# Patient Record
Sex: MALE | Race: WHITE
[De-identification: names, ages, dates, MRNs, and addresses within clinical notes are randomized per-mention and may not be internally consistent; named-entity substitution may affect disease eponyms.]

---

## 2017-04-16 ENCOUNTER — HOSPITAL ENCOUNTER (INPATIENT)
Dept: HOSPITAL 31 - C.ER | Age: 67
LOS: 5 days | Discharge: HOME | DRG: 871 | End: 2017-04-21
Attending: INTERNAL MEDICINE | Admitting: INTERNAL MEDICINE
Payer: MEDICARE

## 2017-04-16 VITALS — BODY MASS INDEX: 31.6 KG/M2

## 2017-04-16 DIAGNOSIS — E78.5: ICD-10-CM

## 2017-04-16 DIAGNOSIS — F17.211: ICD-10-CM

## 2017-04-16 DIAGNOSIS — Z79.899: ICD-10-CM

## 2017-04-16 DIAGNOSIS — E78.00: ICD-10-CM

## 2017-04-16 DIAGNOSIS — J44.0: ICD-10-CM

## 2017-04-16 DIAGNOSIS — I50.33: ICD-10-CM

## 2017-04-16 DIAGNOSIS — E11.9: ICD-10-CM

## 2017-04-16 DIAGNOSIS — A41.9: Primary | ICD-10-CM

## 2017-04-16 DIAGNOSIS — K59.00: ICD-10-CM

## 2017-04-16 DIAGNOSIS — E66.9: ICD-10-CM

## 2017-04-16 DIAGNOSIS — I73.9: ICD-10-CM

## 2017-04-16 DIAGNOSIS — Z79.82: ICD-10-CM

## 2017-04-16 DIAGNOSIS — J45.909: ICD-10-CM

## 2017-04-16 DIAGNOSIS — J44.1: ICD-10-CM

## 2017-04-16 DIAGNOSIS — J18.9: ICD-10-CM

## 2017-04-16 DIAGNOSIS — Z79.4: ICD-10-CM

## 2017-04-16 DIAGNOSIS — R00.0: ICD-10-CM

## 2017-04-16 DIAGNOSIS — Z80.42: ICD-10-CM

## 2017-04-16 DIAGNOSIS — I11.0: ICD-10-CM

## 2017-04-16 DIAGNOSIS — I25.10: ICD-10-CM

## 2017-04-16 DIAGNOSIS — Z91.041: ICD-10-CM

## 2017-04-16 DIAGNOSIS — Z82.49: ICD-10-CM

## 2017-04-16 LAB
ALBUMIN/GLOB SERPL: 1.3 {RATIO} (ref 1–2.1)
ALP SERPL-CCNC: 57 U/L (ref 38–126)
ALT SERPL-CCNC: 42 U/L (ref 21–72)
APTT BLD: 27 SECONDS (ref 21–34)
AST SERPL-CCNC: 22 U/L (ref 17–59)
BASE EXCESS BLDV CALC-SCNC: -5.8 MMOL/L (ref 0–2)
BASOPHILS # BLD AUTO: 0.1 K/UL (ref 0–0.2)
BASOPHILS NFR BLD: 0.6 % (ref 0–2)
BILIRUB SERPL-MCNC: 0.5 MG/DL (ref 0.2–1.3)
BUN SERPL-MCNC: 38 MG/DL (ref 9–20)
CALCIUM SERPL-MCNC: 8.6 MG/DL (ref 8.6–10.4)
CHLORIDE SERPL-SCNC: 103 MMOL/L (ref 98–107)
CO2 SERPL-SCNC: 19 MMOL/L (ref 22–30)
EOSINOPHIL # BLD AUTO: 1 K/UL (ref 0–0.7)
EOSINOPHIL NFR BLD: 10.2 % (ref 0–4)
EOSINOPHIL NFR BLD: 9 % (ref 0–4)
ERYTHROCYTE [DISTWIDTH] IN BLOOD BY AUTOMATED COUNT: 16.6 % (ref 11.5–14.5)
GLOBULIN SER-MCNC: 2.8 GM/DL (ref 2.2–3.9)
GLUCOSE SERPL-MCNC: 199 MG/DL (ref 75–110)
HCT VFR BLD CALC: 38.7 % (ref 35–51)
INR PPP: 1
LG PLATELETS BLD QL SMEAR: PRESENT
LYMPHOCYTES # BLD AUTO: 0.5 K/UL (ref 1–4.3)
LYMPHOCYTES NFR BLD AUTO: 5.6 % (ref 20–40)
MCH RBC QN AUTO: 23.9 PG (ref 27–31)
MCHC RBC AUTO-ENTMCNC: 31.6 G/DL (ref 33–37)
MCV RBC AUTO: 75.7 FL (ref 80–94)
MONOCYTES # BLD: 0.8 K/UL (ref 0–0.8)
MONOCYTES NFR BLD: 8.6 % (ref 0–10)
NEUTROPHILS NFR BLD AUTO: 64 % (ref 50–75)
NRBC BLD AUTO-RTO: 0 % (ref 0–2)
PCO2 BLDV: 37 MMHG (ref 40–60)
PH BLDV: 7.33 [PH] (ref 7.32–7.43)
PLATELET # BLD: 198 K/UL (ref 130–400)
PMV BLD AUTO: 9.2 FL (ref 7.2–11.7)
POTASSIUM SERPL-SCNC: 3.8 MMOL/L (ref 3.6–5.2)
PROT SERPL-MCNC: 6.5 G/DL (ref 6.3–8.3)
SODIUM SERPL-SCNC: 136 MMOL/L (ref 132–148)
TOTAL CELLS COUNTED BLD: 100
WBC # BLD AUTO: 9.5 K/UL (ref 4.8–10.8)

## 2017-04-16 RX ADMIN — METHYLPREDNISOLONE SODIUM SUCCINATE SCH MG: 40 INJECTION, POWDER, FOR SOLUTION INTRAMUSCULAR; INTRAVENOUS at 14:50

## 2017-04-16 RX ADMIN — HUMAN INSULIN SCH: 100 INJECTION, SOLUTION SUBCUTANEOUS at 22:00

## 2017-04-16 RX ADMIN — HUMAN INSULIN SCH UNIT: 100 INJECTION, SOLUTION SUBCUTANEOUS at 19:20

## 2017-04-16 RX ADMIN — METHYLPREDNISOLONE SODIUM SUCCINATE SCH MG: 40 INJECTION, POWDER, FOR SOLUTION INTRAMUSCULAR; INTRAVENOUS at 21:53

## 2017-04-16 RX ADMIN — HUMAN INSULIN SCH UNIT: 100 INJECTION, SOLUTION SUBCUTANEOUS at 17:54

## 2017-04-16 RX ADMIN — PANTOPRAZOLE SODIUM SCH MG: 40 TABLET, DELAYED RELEASE ORAL at 13:18

## 2017-04-16 NOTE — C.PDOC
History Of Present Illness


 67 y/o male whose PMHx includes Asthma/COPD, and HTN, brought to the ED by 

ambulance for evaluation of shortness of breath, cough, chest pain, bilateral 

shoulder pain and bilateral leg pain which began yesterday. Patient denies fever

/chills, back pain, abdominal pain, nausea, vomiting, and diarrhea.


Time Seen by Provider: 17 08:38


Chief Complaint (Nursing): Chest Pain


History Per: Patient, EMS


History/Exam Limitations: no limitations


Onset/Duration Of Symptoms: Hrs


Current Symptoms Are (Timing): Still Present


Severity: Moderate


Quality: "Pain"


Associated Symptoms: denies: Nausea


Additional History Per: Patient





Past Medical History


Reviewed: Historical Data, Nursing Documentation, Vital Signs


Vital Signs: 


 Last Vital Signs











Temp  97.8 F   17 07:10


 


Pulse  98 H  17 07:10


 


Resp  20   17 07:10


 


BP  133/70   17 09:36


 


Pulse Ox  93 L  17 07:10














- Medical History


PMH: Arthritis (B/L), Asthma, COPD, Diabetes, Gastritis, HTN, 

Hypercholesterolemia


Surgical History: No Surg Hx





- [facility] Procedures








CONTRAST ARTERIOGRAM-LEG (09/24/15)


INSERT INT PENILE PROSTH (07/21/15)


PLAIN RADIOGRAPHY OF AORTA, BI LE ART USING L OSM CONTRAST (16)








Family History: States: CAD





- Social History


Hx Tobacco Use: No


Hx Alcohol Use: No


Hx Substance Use: No





- Immunization History


Hx Tetanus Toxoid Vaccination: No


Hx Influenza Vaccination: No


Hx Pneumococcal Vaccination: No





Review Of Systems


Except As Marked, All Systems Reviewed And Found Negative.


Constitutional: Negative for: Fever, Chills


Cardiovascular: Positive for: Chest Pain.  Negative for: Palpitations


Respiratory: Positive for: Cough, Shortness of Breath


Gastrointestinal: Negative for: Nausea, Vomiting, Abdominal Pain, Diarrhea


Musculoskeletal: Positive for: Shoulder Pain (bilateral ), Leg Pain (bilateral )





Physical Exam





- Physical Exam


Appears: Non-toxic, Other (mildly dyspneic)


Skin: Normal Color, Warm, Dry, No Rash


Head: Normacephalic


Eye(s): bilateral: Normal Inspection


Oral Mucosa: Moist


Neck: Supple


Cardiovascular: Rhythm Regular, Other (+tachycardic)


Respiratory: Rales (bilaterally at the bases ), Rhonchi (right lung ), No 

Wheezing, Other (patient speaking in full sentences )


Gastrointestinal/Abdominal: Normal Exam, Bowel Sounds, Soft, No Tenderness, No 

Guarding, No Rebound, Other (+obese )


Back: Normal Inspection, No Vertebral Tenderness, No Paraspinal Tenderness


Extremity: Normal ROM, No Calf Tenderness, Capillary Refill (< 2 seconds all 

digits ), No Swelling, No Other (no erythema to b/l lower extremities )


Pulses: Left Dorsalis Pedis: Normal, Right Dorsalis Pedis: Normal


Neurological/Psych: Oriented x3


Gait: Steady





ED Course And Treatment





- Laboratory Results


Result Diagrams: 


 17 07:17





 17 07:17


ECG: Interpreted By Me, Viewed By Me


ECG Rhythm: Sinus Tachycardia, R BBB


ECG Interpretation: Abnormal


Interpretation Of ECG: Sinus Tachycardia 143bpm. Normal axis. RBBB. No acute ST 

changes.


Rate From EC


O2 Sat by Pulse Oximetry: 94 (RA)


Pulse Ox Interpretation: Normal





- Other Rad


  ** CXR


X-Ray: Interpreted by Me, Viewed By Me, Read By Radiologist


Interpretation: Accession No. : [id number].  Patient Name / ID : ADAM [id number] [id number]  / [id number].  Exam Date : 2017 09:11:23 ( Approved ).  Study 

Comment :  Sex / Age : M  / 66Y.  Creator : Pat Case MD.  

Dictator : Pat Case MD.   :  Approver : Pat Case MD.  Approver2 :  Report Date : 2017 10:03:44.  My Comment :  

********************************************************************************

***.  HISTORY:  SOB.  COMPARISON:  Chest x-ray performed 16.  TECHNIQUE:  

Chest, one view.  FINDINGS:  LUNGS:  Consolidation involving the right lauren 

thorax predominantly within the inferior right upper, the middle, and the right 

lower lobe.  Please note that chest x-ray has limited sensitivity for the 

detection of pulmonary masses.  PLEURA:  Small right pleural effusion. No 

definite pneumothorax .  CARDIOVASCULAR:  Cardiomegaly.  OSSEOUS STRUCTURES:  

Degenerative changes.  VISUALIZED UPPER ABDOMEN:  Unremarkable.  OTHER FINDINGS

:  Examination limited by habitus.  IMPRESSION:  Consolidation involving the 

right lauren thorax predominantly within the inferior right upper, the middle, 

and the right lower lobe, consistent with pneumonia. Small right pleural 

effusion. Correlate clinically. Recommend follow-up to resolution.


Progress Note: Patient placed ion Bipap emergently.  Blood work, CXR and EKG 

ordered and greviewed.  VQ scan ordered to evaluate for PE (iodine allergy).  

Patient given PO ASA, IV solumedrol, duoneb treatment, no Lasix given due to 

borderline low BP.   CXR shows right sided pneumonia - IV Rocephin and IV 

Azithromcycin ordered.





- Physician Consult Information


Physician Contacted: Jude Pate


Outcome Of Conversation: Discussed patient with hospitalist, he agrees with 

admission for copd, pneumonia.





Medical Decision Making


Medical Decision Making: 


differential diagnoses considered: copd/asthma exacerbation, pneumonia, chf 

exacerbation, MI/ACS, PE, pleural effusion, lung CA





Disposition





- Disposition


Disposition: HOSPITALIZED


Disposition Time: 11:47


Condition: STABLE





- Clinical Impression


Clinical Impression: 


 Pneumonia, COPD exacerbation, Sinus tachycardia





- Scribe Statement


The provider has reviewed the documentation as recorded by the Scribe (Yaritza Correa)


Provider Attestation: 





All medical record entries made by the Scribe were at my direction and 

personally dictated by me. I have reviewed the chart and agree that the record 

accurately reflects my personal performance of the history, physical exam, 

medical decision making, and the department course for this patient. I have 

also personally directed, reviewed, and agree with the discharge instructions 

and disposition.





Decision To Admit





- Pt Status Changed To:


Hospital Disposition Of: Inpatient





- Admit Certification


Admit to Inpatient:: After my assessment, the patient will require 

hospitalization for at least two midnights.  This is because of the severity of 

symptoms shown, intensity of services needed, and/or the medical risk in this 

patient being treated as an outpatient.





- InPatient:


Physician Admission Certification: I certify that this patient requires 2 or 

more midnights of care for the following reason:: see notes





- .


Bed Request Type: Telemetry


Admitting Physician: Jude Pate


Patient Diagnosis: 


 Pneumonia, COPD exacerbation, Sinus tachycardia

## 2017-04-16 NOTE — NM
COMPARISON:

* 08/17/2016 ventilation-perfusion scan.



* 12/04/2015 CT thorax. Summary of findings on the comparison 

examination: Multifocal right-sided pulmonary opacity, possibly 

pneumonia.



* April 16, 2017. Single-view chest.



TECHNIQUE:

6.2 mCi technetium 99-m  Xe-133 Gas. 



4.1 mCI technetium 99-m MAA administered intravenously.



FINDINGS:



VENTILATION COMPONENT:

Persistent ventilation abnormalities right lung corresponding to 

multiple prior chest x-rays and CT scans.



PERFUSION COMPONENT:

Heterogeneous profusion, findings correlate to chest x-ray and are 

similar to those seen on the prior radionuclide scan.  Specifically 

matched perfusion abnormalities in the right lung.



IMPRESSION:

Low probability ventilation perfusion scan for pulmonary embolism.

## 2017-04-16 NOTE — RAD
HISTORY:

SOB  



COMPARISON:

Chest x-ray performed 8/17/16 



TECHNIQUE:

Chest, one view.



FINDINGS:





LUNGS:

Consolidation involving the right lauren thorax predominantly within 

the inferior right upper, the middle, and the right lower lobe.



Please note that chest x-ray has limited sensitivity for the 

detection of pulmonary masses.



PLEURA:

Small right pleural effusion. No definite pneumothorax .



CARDIOVASCULAR:

Cardiomegaly. 



OSSEOUS STRUCTURES:

 Degenerative changes.



VISUALIZED UPPER ABDOMEN:

Unremarkable.



OTHER FINDINGS:

Examination limited by habitus.



IMPRESSION:

Consolidation involving the right lauren thorax predominantly within 

the inferior right upper, the middle, and the right lower lobe, 

consistent with pneumonia. Small right pleural effusion. Correlate 

clinically. Recommend follow-up to resolution.
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
## 2017-04-17 LAB
ALBUMIN/GLOB SERPL: 1.2 {RATIO} (ref 1–2.1)
ALP SERPL-CCNC: 54 U/L (ref 38–126)
ALT SERPL-CCNC: 31 U/L (ref 21–72)
APTT BLD: 28 SECONDS (ref 21–34)
AST SERPL-CCNC: 17 U/L (ref 17–59)
BASOPHILS # BLD AUTO: 0 K/UL (ref 0–0.2)
BASOPHILS NFR BLD: 0.1 % (ref 0–2)
BILIRUB SERPL-MCNC: 0.5 MG/DL (ref 0.2–1.3)
BUN SERPL-MCNC: 24 MG/DL (ref 9–20)
CALCIUM SERPL-MCNC: 8.6 MG/DL (ref 8.6–10.4)
CHLORIDE SERPL-SCNC: 101 MMOL/L (ref 98–107)
CHOLEST SERPL-MCNC: 141 MG/DL (ref 0–199)
CO2 SERPL-SCNC: 22 MMOL/L (ref 22–30)
EOSINOPHIL # BLD AUTO: 0 K/UL (ref 0–0.7)
EOSINOPHIL NFR BLD: 0.1 % (ref 0–4)
ERYTHROCYTE [DISTWIDTH] IN BLOOD BY AUTOMATED COUNT: 16.3 % (ref 11.5–14.5)
GLOBULIN SER-MCNC: 2.9 GM/DL (ref 2.2–3.9)
GLUCOSE SERPL-MCNC: 305 MG/DL (ref 75–110)
HCT VFR BLD CALC: 35.7 % (ref 35–51)
INR PPP: 1.3
LG PLATELETS BLD QL SMEAR: PRESENT
LYMPHOCYTES # BLD AUTO: 0.9 K/UL (ref 1–4.3)
LYMPHOCYTES NFR BLD AUTO: 4.3 % (ref 20–40)
MAGNESIUM SERPL-MCNC: 2.2 MG/DL (ref 1.6–2.3)
MCH RBC QN AUTO: 23.8 PG (ref 27–31)
MCHC RBC AUTO-ENTMCNC: 31.3 G/DL (ref 33–37)
MCV RBC AUTO: 75.8 FL (ref 80–94)
MONOCYTES # BLD: 1.5 K/UL (ref 0–0.8)
MONOCYTES NFR BLD: 6.9 % (ref 0–10)
NEUTROPHILS NFR BLD AUTO: 57 % (ref 50–75)
NRBC BLD AUTO-RTO: 0 % (ref 0–2)
PLATELET # BLD: 224 K/UL (ref 130–400)
PMV BLD AUTO: 9.6 FL (ref 7.2–11.7)
POTASSIUM SERPL-SCNC: 4.1 MMOL/L (ref 3.6–5.2)
PROT SERPL-MCNC: 6.4 G/DL (ref 6.3–8.3)
SODIUM SERPL-SCNC: 135 MMOL/L (ref 132–148)
TOTAL CELLS COUNTED BLD: 100
WBC # BLD AUTO: 21.3 K/UL (ref 4.8–10.8)

## 2017-04-17 RX ADMIN — METHYLPREDNISOLONE SODIUM SUCCINATE SCH MG: 40 INJECTION, POWDER, FOR SOLUTION INTRAMUSCULAR; INTRAVENOUS at 14:12

## 2017-04-17 RX ADMIN — POLYVINYL ALCOHOL PRN DROP: 14 SOLUTION/ DROPS OPHTHALMIC at 05:20

## 2017-04-17 RX ADMIN — IPRATROPIUM BROMIDE AND ALBUTEROL SULFATE SCH ML: .5; 3 SOLUTION RESPIRATORY (INHALATION) at 08:36

## 2017-04-17 RX ADMIN — IPRATROPIUM BROMIDE AND ALBUTEROL SULFATE SCH ML: .5; 3 SOLUTION RESPIRATORY (INHALATION) at 11:31

## 2017-04-17 RX ADMIN — HUMAN INSULIN SCH UNIT: 100 INJECTION, SOLUTION SUBCUTANEOUS at 17:08

## 2017-04-17 RX ADMIN — IPRATROPIUM BROMIDE AND ALBUTEROL SULFATE SCH: .5; 3 SOLUTION RESPIRATORY (INHALATION) at 03:40

## 2017-04-17 RX ADMIN — IMIPENEM AND CILASTATIN SODIUM SCH MLS/HR: 250; 250 INJECTION, POWDER, FOR SOLUTION INTRAVENOUS at 17:16

## 2017-04-17 RX ADMIN — Medication SCH MG: at 10:04

## 2017-04-17 RX ADMIN — Medication SCH MG: at 17:59

## 2017-04-17 RX ADMIN — HUMAN INSULIN SCH UNIT: 100 INJECTION, SOLUTION SUBCUTANEOUS at 22:09

## 2017-04-17 RX ADMIN — IPRATROPIUM BROMIDE AND ALBUTEROL SULFATE SCH ML: .5; 3 SOLUTION RESPIRATORY (INHALATION) at 19:17

## 2017-04-17 RX ADMIN — IPRATROPIUM BROMIDE AND ALBUTEROL SULFATE SCH: .5; 3 SOLUTION RESPIRATORY (INHALATION) at 08:37

## 2017-04-17 RX ADMIN — METHYLPREDNISOLONE SODIUM SUCCINATE SCH MG: 40 INJECTION, POWDER, FOR SOLUTION INTRAMUSCULAR; INTRAVENOUS at 05:21

## 2017-04-17 RX ADMIN — IPRATROPIUM BROMIDE AND ALBUTEROL SULFATE SCH ML: .5; 3 SOLUTION RESPIRATORY (INHALATION) at 15:49

## 2017-04-17 RX ADMIN — POLYVINYL ALCOHOL PRN DROP: 14 SOLUTION/ DROPS OPHTHALMIC at 17:59

## 2017-04-17 RX ADMIN — HUMAN INSULIN SCH UNIT: 100 INJECTION, SOLUTION SUBCUTANEOUS at 18:01

## 2017-04-17 RX ADMIN — HUMAN INSULIN SCH UNIT: 100 INJECTION, SOLUTION SUBCUTANEOUS at 12:28

## 2017-04-17 RX ADMIN — IMIPENEM AND CILASTATIN SODIUM SCH MLS/HR: 250; 250 INJECTION, POWDER, FOR SOLUTION INTRAVENOUS at 12:08

## 2017-04-17 RX ADMIN — HUMAN INSULIN SCH UNIT: 100 INJECTION, SOLUTION SUBCUTANEOUS at 08:16

## 2017-04-17 RX ADMIN — IPRATROPIUM BROMIDE AND ALBUTEROL SULFATE SCH ML: .5; 3 SOLUTION RESPIRATORY (INHALATION) at 00:28

## 2017-04-17 RX ADMIN — PANTOPRAZOLE SODIUM SCH MG: 40 TABLET, DELAYED RELEASE ORAL at 10:04

## 2017-04-17 NOTE — CP.PCM.PN
<John Jon - Last Filed: 04/17/17 18:21>





Subjective





- Date & Time of Evaluation


Date of Evaluation: 04/17/17


Time of Evaluation: 09:14





- Subjective


Subjective: 


Pt seen and examined. Pt reports that his shortness of breath has improved. Pt 

complains of pain in his calves bilaterally. Pt denies fever, chills, chest pain

, nausea, and vomiting.





Objective





- Vital Signs/Intake and Output


Vital Signs (last 24 hours): 


 











Temp Pulse Resp BP Pulse Ox


 


 97.8 F   102 H  20   174/81 H  94 L


 


 04/17/17 15:08  04/17/17 15:08  04/17/17 15:08  04/17/17 15:08  04/17/17 15:08








Intake and Output: 


 











 04/17/17 04/17/17





 06:59 18:59


 


Intake Total 480 


 


Output Total 1100 


 


Balance -620 














- Medications


Medications: 


 Current Medications





Albuterol/Ipratropium (Duoneb 3 Mg/0.5 Mg (3 Ml) Ud)  3 ml INH RQ4 [facility]


   Last Admin: 04/17/17 15:49 Dose:  3 ml


Artificial Tears (Artificial Tears)  1 ml OU Q4 PRN


   PRN Reason: Dry eyes


   Last Admin: 04/17/17 17:59 Dose:  1 drop


Aspirin (Ecotrin)  81 mg PO DAILY [facility]


   Last Admin: 04/17/17 10:05 Dose:  81 mg


Diphenhydramine HCl (Benadryl)  50 mg IVP ONCE ONE


   Stop: 04/18/17 08:01


Furosemide (Lasix)  20 mg PO DAILY [facility]


Heparin Sodium (Porcine) (Heparin)  5,000 units SC Q8 [facility]


   Last Admin: 04/17/17 05:21 Dose:  5,000 units


Imipenem/Cilastatin Sodium 250 (mg/ Sodium Chloride)  100 mls @ 100 mls/hr IVPB 

Q6H [facility]


   Last Admin: 04/17/17 17:16 Dose:  100 mls/hr


Vancomycin HCl 1,000 mg/ (Sodium Chloride)  250 mls @ 166.6 mls/hr IVPB Q12H [facility]


   Last Admin: 04/17/17 13:22 Dose:  166.6 mls/hr


Insulin Human Regular (Novolin R)  0 unit SC ACHS [facility]


   PRN Reason: Protocol


   Last Admin: 04/17/17 18:01 Dose:  6 unit


Methylprednisolone (Solu-Medrol)  40 mg IVP ONCE ONE


   Stop: 04/17/17 20:01


Methylprednisolone (Solu-Medrol)  40 mg IVP ONCE ONE


   Stop: 04/18/17 02:01


Methylprednisolone (Solu-Medrol)  40 mg IVP ONCE ONE


   Stop: 04/18/17 08:01


Ondansetron HCl (Zofran Inj)  4 mg IVP Q6 PRN


   PRN Reason: Nausea/Vomiting


Pantoprazole Sodium (Protonix Ec Tab)  40 mg PO DAILY [facility]


   Last Admin: 04/17/17 10:04 Dose:  40 mg


Rosuvastatin Calcium (Crestor)  10 mg PO HS [facility]


   Last Admin: 04/16/17 21:53 Dose:  10 mg


Saccharomyces Boulardii (Florastor)  250 mg PO BID [facility]


   Last Admin: 04/17/17 17:59 Dose:  250 mg


Zolpidem Tartrate (Ambien)  5 mg PO HS PRN


   PRN Reason: Insomnia


   Last Admin: 04/16/17 22:43 Dose:  5 mg











- Labs


Labs: 


 





 04/17/17 07:08 





 04/17/17 07:08 





 











PT  14.2 SECONDS (9.7-12.2)  H  04/17/17  07:08    


 


INR  1.3   04/17/17  07:08    


 


APTT  28 SECONDS (21-34)   04/17/17  07:08    














- Constitutional


Appears: No Acute Distress





- Head Exam


Head Exam: ATRAUMATIC, NORMOCEPHALIC





- Eye Exam


Eye Exam: EOMI, PERRL





- ENT Exam


ENT Exam: Mucous Membranes Moist.  absent: Mucous Membranes Dry





- Respiratory Exam


Respiratory Exam: Decreased Breath Sounds.  absent: Rhonchi, Wheezes


Additional comments: 


Decreased right lung sounds





- Cardiovascular Exam


Cardiovascular Exam: Gallop, +S1, +S2.  absent: Rubs, Murmur





- GI/Abdominal Exam


GI & Abdominal Exam: Distended, Firm.  absent: Tenderness


Additional comments: 


Fluid shift





- Extremities Exam


Extremities Exam: Full ROM.  absent: Pedal Edema





- Neurological Exam


Neurological Exam: Alert, Awake, Oriented x3





- Psychiatric Exam


Psychiatric exam: Normal Affect, Normal Mood





- Skin


Skin Exam: Normal Color, Warm





Assessment and Plan





- Assessment and Plan (Free Text)


Assessment: 


Sepsis:


WBC 21.3


Tachycardic at 114 bpm


Band neutrophils -34


Lactic Acid 2.2


Procalcitonin 14.7


Sputum cultures pending


Blood cultures no growth after 24 hours


Primaxin 250 mg IV q6h


Vancomycin 1 gm IV q12h





Pneumonia:


Initial CXR - consolidation involving Right lauren diaphragm (inferior RUL, RML, 

and RLL) and small right pleural effusion (see full report)


Repeat CXR - biapical pleural thickening, prominent right air space 

consolidative changes inthe right mid to lower lung zone with associated small 

right pleural effusion. Diffuse increased interstitial lung markings (please 

see full report)


WBC 21.3


Tachycardic at 114 bpm


Band neutrophils -34


Lactic Acid 2.2


Procalcitonin 14.7


Primaxin 250 mg IV q6h


Vancomycin 1 gm IV q12h


Chest/Abd/Pelvis CT w/o contrast pending


Infectious Disease, Dr. Finley, consulted. Help appreciated.





Ascities:


Chest/Abd/Pelvis CT w/o contrast pending





Elevated D-Dimer:


D-dimer - 862


V/Q scan - low probability for PE (please see full report)


B/L lower extremity venous dopplers- no evidence of DVTs bilaterally (please 

see full report)





Peripheral Vascular Disease:


Vascular Surgery, Dr. Kaiser, consulted. Help appreciated.


Angiography ileofemoral runoff 9 am tomorrow


Hx of allergy to dye, pt premedicated as per [place] protocol with IV 

solumedrol and benadryl 





CHF:


Cardiology, Dr. Escobedo, consulted. Help appreciated.


Echo pending


Troponins 0.0220 x 1


Pro-BNP 48


Lasin 20 mg po qd





COPD:


Duonebs prn





HTN:


Home medications held due to sepsis; maintain blood pressure





Diabetes Mellitus:


Insulin sliding scale


Accuchecks





Coronary artery disease:


ASA 81 mg PO daily


Crestor 10 mg PO HS





Hyperlipidemia:


Crestor 10 mg PO HS





Prophylactic Measures:


DVT: Heparin 5,000 units SC Q8H, SCDs contraindicated due to lower extremity 

pain 


GI: Protonix 40 mg PO daily











<Jorge Tripp - Last Filed: 04/18/17 09:13>





Objective





- Vital Signs/Intake and Output


Vital Signs (last 24 hours): 


 











Temp Pulse Resp BP Pulse Ox


 


 98 F   99 H  20   172/95 H  92 L


 


 04/18/17 07:10  04/18/17 07:10  04/18/17 07:10  04/18/17 07:10  04/18/17 07:10











- Medications


Medications: 


 Current Medications





Albuterol/Ipratropium (Duoneb 3 Mg/0.5 Mg (3 Ml) Ud)  3 ml INH RQ4 [facility]


   Last Admin: 04/18/17 08:58 Dose:  3 ml


Artificial Tears (Artificial Tears)  1 ml OU Q4 PRN


   PRN Reason: Dry eyes


   Last Admin: 04/17/17 17:59 Dose:  1 drop


Aspirin (Ecotrin)  81 mg PO DAILY [facility]


   Last Admin: 04/17/17 10:05 Dose:  81 mg


Furosemide (Lasix)  20 mg PO DAILY [facility]


   Last Admin: 04/17/17 19:12 Dose:  20 mg


Heparin Sodium (Porcine) (Heparin)  5,000 units SC Q8 [facility]


   Last Admin: 04/18/17 05:21 Dose:  Not Given


Vancomycin HCl 1,000 mg/ (Sodium Chloride)  250 mls @ 166.6 mls/hr IVPB Q12H [facility]


   Last Admin: 04/18/17 00:04 Dose:  166.6 mls/hr


Azithromycin 500 mg/ Sodium (Chloride)  250 mls @ 166.667 mls/hr IVPB Q24H [facility]


Imipenem/Cilastatin Sodium 500 (mg/ Sodium Chloride)  100 mls @ 100 mls/hr IVPB 

Q6H [facility]


   Last Admin: 04/18/17 02:42 Dose:  100 mls/hr


Insulin Human Regular (Novolin R)  0 unit SC ACHS [facility]


   PRN Reason: Protocol


   Last Admin: 04/18/17 08:09 Dose:  8 unit


Ondansetron HCl (Zofran Inj)  4 mg IVP Q6 PRN


   PRN Reason: Nausea/Vomiting


Pantoprazole Sodium (Protonix Ec Tab)  40 mg PO DAILY [facility]


   Last Admin: 04/17/17 10:04 Dose:  40 mg


Rosuvastatin Calcium (Crestor)  10 mg PO HS [facility]


   Last Admin: 04/17/17 21:24 Dose:  10 mg


Saccharomyces Boulardii (Florastor)  250 mg PO BID [facility]


   Last Admin: 04/17/17 17:59 Dose:  250 mg


Zolpidem Tartrate (Ambien)  5 mg PO HS PRN


   PRN Reason: Insomnia


   Last Admin: 04/17/17 22:25 Dose:  5 mg











- Labs


Labs: 


 





 04/18/17 06:35 





 04/18/17 06:35 





 











PT  14.2 SECONDS (9.7-12.2)  H  04/17/17  07:08    


 


INR  1.3   04/17/17  07:08    


 


APTT  28 SECONDS (21-34)   04/17/17  07:08    














Attending/Attestation





- Attestation


I have personally seen and examined this patient.: Yes


I have fully participated in the care of the patient.: Yes


I have reviewed all pertinent clinical information, including history, physical 

exam and plan: Yes


Notes (Text): 


Medical Attending: Patient was seen and examined by me. Agree with the above 

note by the resident





Patient was changed abx to Vancomycimn and Primaxin IV after it was noted an 

increase in the bademia and WBC count. His blood pressure was stable when we saw

, however we will need to follow lactic acid checks as well as ABGs. 





We ordered a CT of the chest, as well as abdomen and pelvis after we noted that 

the patient likley ascities on the exam. However he did not report abdominal 

pain on exam. We will consider paracentesis. 





Will need ID evaluation





thank you


Jorge Tripp
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
## 2017-04-17 NOTE — VASCLAB
PROCEDURE:  Lower Extremity Venous Duplex Exam.



HISTORY:

Calf tenderness, shortness of breath



PRIORS:

Last exam 8/17/2016,Normal.



TECHNIQUE:

Bilateral common femoral, femoral, popliteal and posterior tibial, 

peroneal and great saphenous veins were evaluated. Flow was assessed 

with color Doppler, compressibility, assessment of phasic flow and 

augmentation response.



Report prepared by   ABELARDO Marley



FINDINGS:



RIGHT:

1. Common Femoral Vein: 



1.1. Compressibility - Fully compressible: Thrombus -  None : Flow - 

Phasic: Augmentation -Normal: Reflux - None.



2. Femoral Vein:



2.1. Compressibility - Fully compressible: Thrombus -  None : Flow - 

Phasic: Augmentation -Normal: Reflux - None.



3. Popliteal Vein: 



3.1. Compressibility - Fully compressible: Thrombus - None :  Flow - 

Phasic: Augmentation -Normal: Reflux - None.



4. Posterior Tibial Vein: 



4.1. Compressibility - Fully compressible: Thrombus -  None: Flow - 

Phasic: Augmentation -Normal: Reflux - None.



5. Peroneal Vein:



5.1. Compressibility - Fully compressible: Thrombus -  None: Flow - 

Phasic: Augmentation -Normal: Reflux - None.



6. Great Saphenous Vein:

6.1. Compressibility - Fully compressible: Thrombus - None: Flow - 

Phasic: Augmentation - Normal: Reflux - None.





LEFT:

1. Common Femoral Vein:



1.1.  Compressibility - Fully compressible: Thrombus -  None: Flow - 

Phasic: Augmentation -Normal: Reflux - None.



2. Femoral Vein:



2.1.  Compressibility - Fully compressible: Thrombus -  None: Flow - 

Phasic: Augmentation -Normal: Reflux - None.



3. Popliteal Vein:



3.1.  Compressibility - Fully compressible: Thrombus -  None : Flow - 

Phasic: Augmentation -Normal: Reflux - None.



4. Posterior Tibial Vein:



4.1.  Compressibility - Fully compressible: Thrombus -  None: Flow - 

Phasic: Augmentation -Normal: Reflux - None.



5. Peroneal Vein:



5.1.  Compressibility - Fully compressible: Thrombus -  None: Flow - 

Phasic: Augmentation -Normal: Reflux - None.



6. Great Saphenous Vein:

6.1.  Compressibility - Fully compressible: Thrombus -  None: Flow - 

Phasic: Augmentation - Normal: Reflux - None.





OTHER FINDINGS:  



IMPRESSION:

1. No evidence of deep or superficial venous thrombosis in bilateral 

lower extremities.



Incidental findings:



1. Patent right superficial femoral artery stent. 



2. Normal arterial flow noted distally at bilateral tibial arteries, 

with triphasic waveforms.

## 2017-04-17 NOTE — RAD
HISTORY:

pneumonia  



COMPARISON:

04/16/2017 



FINDINGS:



LUNGS:

Elevated right hemidiaphragm. Biapical pleural thickening with upper 

lobe granulomatous changes. Prominent airspace consolidative changes 

in the right mid to lower lung zone with associated small right 

pleural effusion. Diffuse increased interstitial lung markings.



PLEURA:

Small to moderate right pleural effusion.



CARDIOVASCULAR:

Cardiomegaly.



OSSEOUS STRUCTURES:

Degenerative changes in the spine and shoulders.



VISUALIZED UPPER ABDOMEN:

Normal.



OTHER FINDINGS:

None.



IMPRESSION:





Elevated right hemidiaphragm. Biapical pleural thickening with upper 

lobe granulomatous changes. Prominent airspace consolidative changes 

in the right mid to lower lung zone with associated small right 

pleural effusion. Diffuse increased interstitial lung markings.



Small to moderate right pleural effusion. 



Cardiomegaly.

## 2017-04-18 LAB
ALBUMIN/GLOB SERPL: 1.1 {RATIO} (ref 1–2.1)
ALP SERPL-CCNC: 68 U/L (ref 38–126)
ALT SERPL-CCNC: 46 U/L (ref 21–72)
ARTERIAL PATENCY WRIST A: (no result)
AST SERPL-CCNC: 17 U/L (ref 17–59)
BASE EXCESS BLDV CALC-SCNC: -2.1 MMOL/L (ref 0–2)
BASOPHILS # BLD AUTO: 0 K/UL (ref 0–0.2)
BASOPHILS NFR BLD: 0 % (ref 0–2)
BILIRUB SERPL-MCNC: 0.7 MG/DL (ref 0.2–1.3)
BUN SERPL-MCNC: 26 MG/DL (ref 9–20)
CALCIUM SERPL-MCNC: 8.3 MG/DL (ref 8.6–10.4)
CHLORIDE SERPL-SCNC: 101 MMOL/L (ref 98–107)
CO2 SERPL-SCNC: 25 MMOL/L (ref 22–30)
DRAW SITE: (no result)
EOSINOPHIL # BLD AUTO: 0 K/UL (ref 0–0.7)
EOSINOPHIL NFR BLD: 0 % (ref 0–4)
EOSINOPHIL NFR BLD: 1 % (ref 0–4)
ERYTHROCYTE [DISTWIDTH] IN BLOOD BY AUTOMATED COUNT: 16.7 % (ref 11.5–14.5)
GLOBULIN SER-MCNC: 3 GM/DL (ref 2.2–3.9)
GLUCOSE SERPL-MCNC: 406 MG/DL (ref 75–110)
HCT VFR BLD CALC: 35.3 % (ref 35–51)
LYMPHOCYTES # BLD AUTO: 0.9 K/UL (ref 1–4.3)
LYMPHOCYTES NFR BLD AUTO: 4.3 % (ref 20–40)
MAGNESIUM SERPL-MCNC: 2.2 MG/DL (ref 1.6–2.3)
MCH RBC QN AUTO: 24 PG (ref 27–31)
MCHC RBC AUTO-ENTMCNC: 31.7 G/DL (ref 33–37)
MCV RBC AUTO: 75.6 FL (ref 80–94)
MONOCYTES # BLD: 1.2 K/UL (ref 0–0.8)
MONOCYTES NFR BLD: 6 % (ref 0–10)
NEUTROPHILS NFR BLD AUTO: 71 % (ref 50–75)
NRBC BLD AUTO-RTO: 0 % (ref 0–2)
PCO2 BLDV: 30 MMHG (ref 40–60)
PH BLDV: 7.45 [PH] (ref 7.32–7.43)
PHOSPHATE SERPL-MCNC: 2.7 MG/DL (ref 2.5–4.5)
PLATELET # BLD: 242 K/UL (ref 130–400)
PMV BLD AUTO: 9.8 FL (ref 7.2–11.7)
POTASSIUM SERPL-SCNC: 4.8 MMOL/L (ref 3.6–5.2)
PROT SERPL-MCNC: 6.4 G/DL (ref 6.3–8.3)
SODIUM SERPL-SCNC: 134 MMOL/L (ref 132–148)
TOTAL CELLS COUNTED BLD: 100
WBC # BLD AUTO: 20.8 K/UL (ref 4.8–10.8)

## 2017-04-18 RX ADMIN — HUMAN INSULIN SCH UNIT: 100 INJECTION, SOLUTION SUBCUTANEOUS at 12:32

## 2017-04-18 RX ADMIN — IPRATROPIUM BROMIDE AND ALBUTEROL SULFATE SCH ML: .5; 3 SOLUTION RESPIRATORY (INHALATION) at 12:02

## 2017-04-18 RX ADMIN — IPRATROPIUM BROMIDE AND ALBUTEROL SULFATE SCH ML: .5; 3 SOLUTION RESPIRATORY (INHALATION) at 20:48

## 2017-04-18 RX ADMIN — IPRATROPIUM BROMIDE AND ALBUTEROL SULFATE SCH: .5; 3 SOLUTION RESPIRATORY (INHALATION) at 00:48

## 2017-04-18 RX ADMIN — PANTOPRAZOLE SODIUM SCH MG: 40 TABLET, DELAYED RELEASE ORAL at 10:05

## 2017-04-18 RX ADMIN — Medication SCH MG: at 17:59

## 2017-04-18 RX ADMIN — IPRATROPIUM BROMIDE AND ALBUTEROL SULFATE SCH: .5; 3 SOLUTION RESPIRATORY (INHALATION) at 04:37

## 2017-04-18 RX ADMIN — HUMAN INSULIN SCH UNIT: 100 INJECTION, SOLUTION SUBCUTANEOUS at 17:04

## 2017-04-18 RX ADMIN — POLYVINYL ALCOHOL PRN DROP: 14 SOLUTION/ DROPS OPHTHALMIC at 10:04

## 2017-04-18 RX ADMIN — POLYVINYL ALCOHOL PRN DROP: 14 SOLUTION/ DROPS OPHTHALMIC at 18:50

## 2017-04-18 RX ADMIN — HUMAN INSULIN SCH UNIT: 100 INJECTION, SOLUTION SUBCUTANEOUS at 08:09

## 2017-04-18 RX ADMIN — HUMAN INSULIN SCH UNIT: 100 INJECTION, SOLUTION SUBCUTANEOUS at 21:48

## 2017-04-18 RX ADMIN — IPRATROPIUM BROMIDE AND ALBUTEROL SULFATE SCH ML: .5; 3 SOLUTION RESPIRATORY (INHALATION) at 08:58

## 2017-04-18 RX ADMIN — Medication SCH MG: at 10:05

## 2017-04-18 RX ADMIN — IPRATROPIUM BROMIDE AND ALBUTEROL SULFATE SCH ML: .5; 3 SOLUTION RESPIRATORY (INHALATION) at 15:43

## 2017-04-18 RX ADMIN — INSULIN GLARGINE SCH UNITS: 100 INJECTION, SOLUTION SUBCUTANEOUS at 18:51

## 2017-04-18 NOTE — CARD
--------------- APPROVED REPORT --------------





EKG Measurement

Heart [id number]

PA [id number]

[id number]

[id number]

[id number]



<Conclusion>

Sinus tachycardia

Right bundle branch block

Abnormal ECG

## 2017-04-18 NOTE — CP.PCM.PN
<John Jon - Last Filed: 04/18/17 15:30>





Subjective





- Date & Time of Evaluation


Date of Evaluation: 04/18/17


Time of Evaluation: 07:19





- Subjective


Subjective: 


Pt seen and examined. Pt reports that he is feeling better today but has pain 

in his legs bilaterally and complains of constipation. Pt denies fever, chills, 

chest pain, shortness of breath, nausea, and vomiting.





Objective





- Vital Signs/Intake and Output


Vital Signs (last 24 hours): 


 











Temp Pulse Resp BP Pulse Ox


 


 98 F   99 H  20   172/95 H  92 L


 


 04/18/17 07:10  04/18/17 07:10  04/18/17 07:10  04/18/17 10:05  04/18/17 07:10











- Medications


Medications: 


 Current Medications





Albuterol/Ipratropium (Duoneb 3 Mg/0.5 Mg (3 Ml) Ud)  3 ml INH RQ4 [facility]


   Last Admin: 04/18/17 12:02 Dose:  3 ml


Artificial Tears (Artificial Tears)  1 ml OU Q4 PRN


   PRN Reason: Dry eyes


   Last Admin: 04/18/17 10:04 Dose:  1 drop


Aspirin (Ecotrin)  81 mg PO DAILY [facility]


   Last Admin: 04/18/17 10:05 Dose:  81 mg


Docusate Sodium (Colace)  100 mg PO DAILY [facility]


Furosemide (Lasix)  20 mg IVP DAILY [facility]


Heparin Sodium (Porcine) (Heparin)  5,000 units SC Q8 [facility]


   Last Admin: 04/18/17 14:38 Dose:  Not Given


Hydrochlorothiazide (Microzide)  12.5 mg PO DAILY [facility]


   Last Admin: 04/18/17 10:06 Dose:  12.5 mg


Vancomycin HCl 1,000 mg/ (Sodium Chloride)  250 mls @ 166.6 mls/hr IVPB Q12H [facility]


   Last Admin: 04/18/17 13:14 Dose:  166.6 mls/hr


Azithromycin 500 mg/ Sodium (Chloride)  250 mls @ 166.667 mls/hr IVPB Q24H [facility]


Imipenem/Cilastatin Sodium 500 (mg/ Sodium Chloride)  100 mls @ 100 mls/hr IVPB 

Q6H [facility]


   Last Admin: 04/18/17 12:55 Dose:  Not Given


Insulin Human Regular (Novolin R)  0 unit SC ACHS [facility]


   PRN Reason: Protocol


   Last Admin: 04/18/17 12:32 Dose:  12 unit


Losartan Potassium (Cozaar)  50 mg PO DAILY [facility]


   Last Admin: 04/18/17 10:22 Dose:  50 mg


Ondansetron HCl (Zofran Inj)  4 mg IVP Q6 PRN


   PRN Reason: Nausea/Vomiting


Pantoprazole Sodium (Protonix Ec Tab)  40 mg PO DAILY [facility]


   Last Admin: 04/18/17 10:05 Dose:  40 mg


Rosuvastatin Calcium (Crestor)  10 mg PO HS [facility]


   Last Admin: 04/17/17 21:24 Dose:  10 mg


Saccharomyces Boulardii (Florastor)  250 mg PO BID [facility]


   Last Admin: 04/18/17 10:05 Dose:  250 mg


Zolpidem Tartrate (Ambien)  5 mg PO HS PRN


   PRN Reason: Insomnia


   Last Admin: 04/17/17 22:25 Dose:  5 mg











- Labs


Labs: 


 





 04/18/17 06:35 





 04/18/17 06:35 





 











PT  14.2 SECONDS (9.7-12.2)  H  04/17/17  07:08    


 


INR  1.3   04/17/17  07:08    


 


APTT  28 SECONDS (21-34)   04/17/17  07:08    














- Constitutional


Appears: No Acute Distress





- Head Exam


Head Exam: ATRAUMATIC, NORMOCEPHALIC





- Eye Exam


Eye Exam: EOMI, PERRL





- ENT Exam


ENT Exam: Mucous Membranes Moist.  absent: Mucous Membranes Dry





- Respiratory Exam


Respiratory Exam: Decreased Breath Sounds


Additional comments: 


Decreased breath sounds on right lung





- Cardiovascular Exam


Cardiovascular Exam: +S1, +S2.  absent: Gallop, Rubs





- GI/Abdominal Exam


GI & Abdominal Exam: Distended, Firm.  absent: Tenderness





- Extremities Exam


Extremities Exam: absent: Pedal Edema





- Neurological Exam


Neurological Exam: Alert, Awake, Oriented x3





- Psychiatric Exam


Psychiatric exam: Normal Affect, Normal Mood





- Skin


Skin Exam: Normal Color, Warm





Assessment and Plan





- Assessment and Plan (Free Text)


Assessment: 


Sepsis:


WBC 20.8


Tachycardic at 106 bpm


Band neutrophils -21, improved


Lactic Acid worsened from 2.2 to 2.8


Procalcitonin 14.7


Sputum cultures pending


Blood cultures no growth after 48 hours


Primaxin 250 mg IV q6h


Vancomycin 1 gm IV q12h


Azithromycin 500 mg IV qd





Pneumonia:


Initial CXR - consolidation involving Right lauren diaphragm (inferior RUL, RML, 

and RLL) and small right pleural effusion (see full report)


Repeat CXR - biapical pleural thickening, prominent right air space 

consolidative changes inthe right mid to lower lung zone with associated small 

right pleural effusion. Diffuse increased interstitial lung markings (please 

see full report)


WBC 21.3


Tachycardic at 114 bpm


Band neutrophils -21, improved


Lactic Acid 2.8


Procalcitonin 14.7


Primaxin 500 mg IV q6h


Vancomycin 1 gm IV q12h


Azithromycin 500 mg IV qd


Chest/Abd/Pelvis CT w/o contrast pending


Infectious Disease, Dr. Finley, consulted. Help appreciated.


Chest/Abd/Pelvis CT w/o contrast - extensive patchy infiltrates throughout the 

right hemithorax; bibasilar ateletasis, infilrates; indeterminate right 

axillary mass; no abdominal or pelvic ascites; enlarged prostate gland (please 

see full report)


Pulmonology, Dr. Henry, consulted. Help appreciated. 





Elevated D-Dimer:


D-dimer - 862


V/Q scan - low probability for PE (please see full report)


B/L lower extremity venous dopplers- no evidence of DVTs bilaterally (please 

see full report)





Peripheral Vascular Disease:


Vascular Surgery, Dr. Kaiser, consulted. Help appreciated.


Angiography ileofemoral runoff - distal SFA patent patient with mild instent; 

remarkable common femoral artery and posterior tibial artery; possible areas of 

stenosis within the proximal peroneal artery (please see full report)





CHF:


Cardiology, Dr. Escobedo, consulted. Help appreciated.


Echo pending


Troponins x 30.0220, 0.0600, 0.0390


Pro-BNP 48


Lasin 20 mg po qd





COPD:


Duonebs prn





HTN:


Home medications held due to sepsis; maintain blood pressure





Diabetes Mellitus:


Insulin sliding scale


Accuchecks





Coronary artery disease:


ASA 81 mg PO daily


Crestor 10 mg PO HS





Hyperlipidemia:


Crestor 10 mg PO HS





Prophylactic Measures:


DVT: Heparin 5,000 units SC Q8H, SCDs contraindicated due to lower extremity 

pain 


GI: Protonix 40 mg PO daily





<Jorge Tripp H - Last Filed: 04/18/17 17:10>





Objective





- Vital Signs/Intake and Output


Vital Signs (last 24 hours): 


 











Temp Pulse Resp BP Pulse Ox


 


 98.3 F   95 H  20   160/81 H  94 L


 


 04/18/17 15:06  04/18/17 15:06  04/18/17 15:06  04/18/17 15:06  04/18/17 15:06











- Medications


Medications: 


 Current Medications





Albuterol/Ipratropium (Duoneb 3 Mg/0.5 Mg (3 Ml) Ud)  3 ml INH RQ4 [facility]


   Last Admin: 04/18/17 15:43 Dose:  3 ml


Artificial Tears (Artificial Tears)  1 ml OU Q4 PRN


   PRN Reason: Dry eyes


   Last Admin: 04/18/17 10:04 Dose:  1 drop


Aspirin (Ecotrin)  81 mg PO DAILY [facility]


   Last Admin: 04/18/17 10:05 Dose:  81 mg


Docusate Sodium (Colace)  100 mg PO DAILY [facility]


   Last Admin: 04/18/17 16:05 Dose:  Not Given


Furosemide (Lasix)  20 mg IVP DAILY [facility]


   Last Admin: 04/18/17 16:06 Dose:  Not Given


Heparin Sodium (Porcine) (Heparin)  5,000 units SC Q8 [facility]


   Last Admin: 04/18/17 14:38 Dose:  Not Given


Hydrochlorothiazide (Microzide)  12.5 mg PO DAILY [facility]


   Last Admin: 04/18/17 10:06 Dose:  12.5 mg


Vancomycin HCl 1,000 mg/ (Sodium Chloride)  250 mls @ 166.6 mls/hr IVPB Q12H [facility]


   Last Admin: 04/18/17 13:14 Dose:  166.6 mls/hr


Azithromycin 500 mg/ Sodium (Chloride)  250 mls @ 166.667 mls/hr IVPB Q24H [facility]


Imipenem/Cilastatin Sodium 500 (mg/ Sodium Chloride)  100 mls @ 100 mls/hr IVPB 

Q6H [facility]


   Last Admin: 04/18/17 15:46 Dose:  100 mls/hr


Insulin Human Regular (Novolin R)  0 unit SC ACHS [facility]


   PRN Reason: Protocol


   Last Admin: 04/18/17 12:32 Dose:  12 unit


Losartan Potassium (Cozaar)  50 mg PO DAILY [facility]


   Last Admin: 04/18/17 10:22 Dose:  50 mg


Ondansetron HCl (Zofran Inj)  4 mg IVP Q6 PRN


   PRN Reason: Nausea/Vomiting


Pantoprazole Sodium (Protonix Ec Tab)  40 mg PO DAILY [facility]


   Last Admin: 04/18/17 10:05 Dose:  40 mg


Rosuvastatin Calcium (Crestor)  10 mg PO HS [facility]


   Last Admin: 04/17/17 21:24 Dose:  10 mg


Saccharomyces Boulardii (Florastor)  250 mg PO BID [facility]


   Last Admin: 04/18/17 10:05 Dose:  250 mg


Zolpidem Tartrate (Ambien)  5 mg PO HS PRN


   PRN Reason: Insomnia


   Last Admin: 04/17/17 22:25 Dose:  5 mg











- Labs


Labs: 


 





 04/18/17 06:35 





 04/18/17 06:35 





 











PT  14.2 SECONDS (9.7-12.2)  H  04/17/17  07:08    


 


INR  1.3   04/17/17  07:08    


 


APTT  28 SECONDS (21-34)   04/17/17  07:08    














Attending/Attestation





- Attestation


I have personally seen and examined this patient.: Yes


I have fully participated in the care of the patient.: Yes


I have reviewed all pertinent clinical information, including history, physical 

exam and plan: Yes


Notes (Text): 





Medical attending: Patient was seen and examined by me, agrees the above note 

by medical resident. The CAT scan of the chest abdomen and pelvis has returned. 

And surprisingly he does not have ascites. I look through the film self and 

also saw the report. I was initially thinking that the patient could've may be 

possibly had SBP however however there is there is no ascites on the CT scan of 

the abdomen and pelvis. When it does show is that on the right lung has a very 

extensive what appears to be pneumonia. I did see this on the chest x-ray when 

the patient was admitted however we were not expecting the pneumonia to be that 

large.





The patient does has a white blood cell count that is slightly decreased as 

well as a bandemia count is slightly decreased. Will continue continue to check 

lactic acid levels, as well as follow blood pressure heart rate and temperature 

readings. We also need to monitor his cultures if they should return positive





At this time we will not give intravenous fluids only because his blood 

pressures have been on the very high side. At the same token we will not give 

blood pressure medications since he is still sepsis at this time





Thank you very much,


Jorge Tripp

## 2017-04-18 NOTE — CP.PCM.PN
<Gennaro Mar - Last Filed: 04/18/17 11:39>





Subjective





- Date & Time of Evaluation


Date of Evaluation: 04/18/17


Time of Evaluation: 07:52





- Subjective


Subjective: 


Cardiology Progress Note


Dr. Escobedo








Patient seen and examined at the bedside.  No acute distress.  No acute events 

overnight.  Nursing staff reports no issues.  The patient is resting 

comfortably in bed at this time.  The patient reports abdominal pain and 

constipation.  The patient denies all cardiopulmonary complaints.  12 point 

review of systems was preformed and the patient denied all complaints at this 

time aside from abdominal pain.  





Objective





- Vital Signs/Intake and Output


Vital Signs (last 24 hours): 


 











Temp Pulse Resp BP Pulse Ox


 


 98.1 F   100 H  20   165/84 H  96 


 


 04/17/17 23:55  04/17/17 23:55  04/17/17 23:55  04/17/17 23:55  04/17/17 23:55











- Medications


Medications: 


 Current Medications





Albuterol/Ipratropium (Duoneb 3 Mg/0.5 Mg (3 Ml) Ud)  3 ml INH RQ4 [facility]


   Last Admin: 04/18/17 04:37 Dose:  Not Given


Artificial Tears (Artificial Tears)  1 ml OU Q4 PRN


   PRN Reason: Dry eyes


   Last Admin: 04/17/17 17:59 Dose:  1 drop


Aspirin (Ecotrin)  81 mg PO DAILY [facility]


   Last Admin: 04/17/17 10:05 Dose:  81 mg


Diphenhydramine HCl (Benadryl)  50 mg IVP ONCE ONE


   Stop: 04/18/17 08:01


Furosemide (Lasix)  20 mg PO DAILY [facility]


   Last Admin: 04/17/17 19:12 Dose:  20 mg


Heparin Sodium (Porcine) (Heparin)  5,000 units SC Q8 [facility]


   Last Admin: 04/18/17 05:21 Dose:  Not Given


Vancomycin HCl 1,000 mg/ (Sodium Chloride)  250 mls @ 166.6 mls/hr IVPB Q12H [facility]


   Last Admin: 04/18/17 00:04 Dose:  166.6 mls/hr


Azithromycin 500 mg/ Sodium (Chloride)  250 mls @ 166.667 mls/hr IVPB Q24H [facility]


Imipenem/Cilastatin Sodium 500 (mg/ Sodium Chloride)  100 mls @ 100 mls/hr IVPB 

Q6H [facility]


   Last Admin: 04/18/17 02:42 Dose:  100 mls/hr


Insulin Human Regular (Novolin R)  0 unit SC ACHS [facility]


   PRN Reason: Protocol


   Last Admin: 04/17/17 22:09 Dose:  4 unit


Methylprednisolone (Solu-Medrol)  40 mg IVP ONCE ONE


   Stop: 04/18/17 08:01


Ondansetron HCl (Zofran Inj)  4 mg IVP Q6 PRN


   PRN Reason: Nausea/Vomiting


Pantoprazole Sodium (Protonix Ec Tab)  40 mg PO DAILY [facility]


   Last Admin: 04/17/17 10:04 Dose:  40 mg


Rosuvastatin Calcium (Crestor)  10 mg PO HS [facility]


   Last Admin: 04/17/17 21:24 Dose:  10 mg


Saccharomyces Boulardii (Florastor)  250 mg PO BID [facility]


   Last Admin: 04/17/17 17:59 Dose:  250 mg


Zolpidem Tartrate (Ambien)  5 mg PO HS PRN


   PRN Reason: Insomnia


   Last Admin: 04/17/17 22:25 Dose:  5 mg











- Labs


Labs: 


 





 04/18/17 06:35 





 04/18/17 06:35 





 











PT  14.2 SECONDS (9.7-12.2)  H  04/17/17  07:08    


 


INR  1.3   04/17/17  07:08    


 


APTT  28 SECONDS (21-34)   04/17/17  07:08    














- Additional Findings


Additional findings: 


- Constitutional


Appears: No Acute Distress





- Head Exam


Head Exam: ATRAUMATIC, NORMAL INSPECTION, NORMOCEPHALIC





- Eye Exam


Eye Exam: EOMI, Normal appearance





- ENT Exam


ENT Exam: Mucous Membranes Moist





- Neck Exam


Neck exam: Positive for: Full Rom, Normal Inspection.  Negative for: 

Lymphadenopathy





- Respiratory Exam


Respiratory Exam: Decreased Breath Sounds (Decreased on the Right Base > Mid 

lung field ), NORMAL BREATHING PATTERN.  absent: Accessory Muscle Use, Clear to 

Auscultation Bilateral, Rhonchi, Wheezes, Respiratory Distress





- Cardiovascular Exam


Cardiovascular Exam: REGULAR RHYTHM, RRR, +S1, +S2.  absent: Diastolic murmur, 

Systolic Murmur





- GI/Abdominal Exam


GI & Abdominal Exam: Distended, Normal Bowel Sounds, Soft.  absent: Bruit, 

Guarding, Rebound, Rigid, Tenderness





- Extremities Exam


Extremities exam: Positive for: calf tenderness (B/L), normal inspection, pedal 

edema (1+ B/L), tenderness, pedal pulses present.  Negative for: joint swelling





- Neurological Exam


Neurological exam: Alert, CN II-XII Intact, Oriented x3, Reflexes Normal





- Skin


Skin Exam: Dry, Intact, Normal Color, Warm





Assessment and Plan


(1) Diastolic CHF


Assessment & Plan: 


Lasix 20mg IV daily


Repeat echo ordered- pending


Gt hose stockings


Continue:


   - aspirin 81mg po daily


   - Crestor 10mg po hs


4/16/17 EKG- Sinus Tachycardia, Normal AL, widened QRS and prolonged QTc, Right 

axis, Right bundle branch block


8/17/16 Echo- LV EF 75%


9/19/15 Echo- LV EF 80% diastolic dysfunction








Case discussed with Dr. Fanny Mar PGY1


Status: h   





<Kojo Escobedo - Last Filed: 05/29/17 18:03>





Objective





- Vital Signs/Intake and Output


Vital Signs (last 24 hours): 


 











Temp Pulse Resp BP Pulse Ox


 


 97.8 F   98 H  20   133/70   94 L


 


 04/21/17 07:10  04/21/17 07:10  04/21/17 07:10  04/21/17 09:36  04/23/17 09:19











- Labs


Labs: 


 





 04/21/17 07:17 





 04/21/17 07:17 





 











PT  14.2 SECONDS (9.7-12.2)  H  04/17/17  07:08    


 


INR  1.3   04/17/17  07:08    


 


APTT  28 SECONDS (21-34)   04/17/17  07:08    














Attending/Attestation





- Attestation


I have personally seen and examined this patient.: Yes


I have fully participated in the care of the patient.: Yes


I have reviewed all pertinent clinical information, including history, physical 

exam and plan: Yes


Notes (Text): 





05/29/17 18:03


resting comfortably tolerating PO
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
## 2017-04-18 NOTE — CARD
--------------- APPROVED REPORT --------------





EXAM: Two-dimensional and M-mode echocardiogram with Doppler and 

color Doppler.



Other Information 

Quality : GoodRhythm : NSR



INDICATION

Dizziness and Vertigo Dyspnea Pleural Effusion Chest Pain Congestive 

Heart Failure PNEUNOMIA /COD EXAC/SINUS TACHYCARDIA,



RISK FACTORS

Hypertension 

Hyperlipidemia

Diabetes



M-Mode DIMENSIONS 

RVDd1.87   (2.1-3.2cm)Left Atrium (MM)4.37   (2.5-4.0cm)

IVSd1.68   (0.7-1.1cm)Aortic Root3.20   (2.2-3.7cm)

LVDd4.69   (4.0-5.6cm)Aortic Cusp Exc.2.03   (1.5-2.0cm)

PWd1.72   (0.7-1.1cm)FS (%) 40   %

LVDs2.81   (2.0-3.8cm)LVEF (%)71   (>50%)



Aortic Valve

AoV Peak Sggfmchn021.1cm/Vick Peak GR.16mmHg



Mitral Valve

MV E Jnqjqtit317.5cm/sMV A Vxdjpmys768.4cm/sE/A ratio0.9



TDI

E/Lateral E'0.0E/Medial E'0.0



Tricuspid Valve

TR Peak [id number]/sTR Peak [id number]



 LEFT VENTRICLE 

The left ventricle is normal size.

There is normal left ventricular wall thickness.

The left ventricular function is normal.

The left ventricular ejection fraction is within the normal range.

There is normal LV segmental wall motion.

Transmitral Doppler flow pattern is abnormal.



 RIGHT VENTRICLE 

The right ventricle is normal size.



 ATRIA 

The left atrium is mildly dilated.

The right atrium size is normal.



 AORTIC VALVE 

The aortic valve is normal in structure.



 MITRAL VALVE 

The mitral valve is normal in structure.



 TRICUSPID VALVE 

The tricuspid valve is normal in structure.



<Conclusion>

Normal LV systolic finction.

Diastolic dysfunction.

Dilated LA.

No significant valvular abnormality seen.

## 2017-04-18 NOTE — CP.PCM.PN
Subjective





- Date & Time of Evaluation


Date of Evaluation: 04/17/17


Time of Evaluation: 07:00





- Subjective


Subjective: 


VASCULAR SURGERY PROGRESS NOTE FOR DR. LOGAN





Patient seen and examined at bedside. He states that he has pain in both of his 

legs but Left more than right.





Objective





- Vital Signs/Intake and Output


Vital Signs (last 24 hours): 


 











Temp Pulse Resp BP Pulse Ox


 


 98.1 F   100 H  20   165/84 H  96 


 


 04/17/17 23:55  04/17/17 23:55  04/17/17 23:55  04/17/17 23:55  04/17/17 23:55











- Medications


Medications: 


 Current Medications





Albuterol/Ipratropium (Duoneb 3 Mg/0.5 Mg (3 Ml) Ud)  3 ml INH RQ4 [facility]


   Last Admin: 04/18/17 04:37 Dose:  Not Given


Artificial Tears (Artificial Tears)  1 ml OU Q4 PRN


   PRN Reason: Dry eyes


   Last Admin: 04/17/17 17:59 Dose:  1 drop


Aspirin (Ecotrin)  81 mg PO DAILY [facility]


   Last Admin: 04/17/17 10:05 Dose:  81 mg


Diphenhydramine HCl (Benadryl)  50 mg IVP ONCE ONE


   Stop: 04/18/17 08:01


Furosemide (Lasix)  20 mg PO DAILY [facility]


   Last Admin: 04/17/17 19:12 Dose:  20 mg


Heparin Sodium (Porcine) (Heparin)  5,000 units SC Q8 [facility]


   Last Admin: 04/18/17 05:21 Dose:  Not Given


Vancomycin HCl 1,000 mg/ (Sodium Chloride)  250 mls @ 166.6 mls/hr IVPB Q12H [facility]


   Last Admin: 04/18/17 00:04 Dose:  166.6 mls/hr


Azithromycin 500 mg/ Sodium (Chloride)  250 mls @ 166.667 mls/hr IVPB Q24H [facility]


Imipenem/Cilastatin Sodium 500 (mg/ Sodium Chloride)  100 mls @ 100 mls/hr IVPB 

Q6H [facility]


   Last Admin: 04/18/17 02:42 Dose:  100 mls/hr


Insulin Human Regular (Novolin R)  0 unit SC ACHS [facility]


   PRN Reason: Protocol


   Last Admin: 04/17/17 22:09 Dose:  4 unit


Methylprednisolone (Solu-Medrol)  40 mg IVP ONCE ONE


   Stop: 04/18/17 08:01


Ondansetron HCl (Zofran Inj)  4 mg IVP Q6 PRN


   PRN Reason: Nausea/Vomiting


Pantoprazole Sodium (Protonix Ec Tab)  40 mg PO DAILY Count includes the [facility]


   Last Admin: 04/17/17 10:04 Dose:  40 mg


Rosuvastatin Calcium (Crestor)  10 mg PO HS Count includes the [facility]


   Last Admin: 04/17/17 21:24 Dose:  10 mg


Saccharomyces Boulardii (Florastor)  250 mg PO BID Count includes the [facility]


   Last Admin: 04/17/17 17:59 Dose:  250 mg


Zolpidem Tartrate (Ambien)  5 mg PO HS PRN


   PRN Reason: Insomnia


   Last Admin: 04/17/17 22:25 Dose:  5 mg











- Labs


Labs: 


 





 04/18/17 06:35 





 04/18/17 06:35 





 











PT  14.2 SECONDS (9.7-12.2)  H  04/17/17  07:08    


 


INR  1.3   04/17/17  07:08    


 


APTT  28 SECONDS (21-34)   04/17/17  07:08    














Assessment and Plan





- Assessment and Plan (Free Text)


Assessment: 


67yo M with PMHx of PAD who has bilateral leg pain





- Angiography ileofemoral runoff Tues AM


- Due to pts hx of allergy, was premedicated with IV solumedrol and benadryl


- Discussed plan with Dr. Awilda Rodriguez PGY-2
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
## 2017-04-18 NOTE — CARD
--------------- APPROVED REPORT --------------





EKG Measurement

Heart [id number]

LA [id number]

[id number]

[id number]

[id number]



<Conclusion>

Sinus tachycardia

Possible Left atrial enlargement

Right bundle branch block

Abnormal ECG

## 2017-04-18 NOTE — CT
CT chest, abdomen, and pelvis without IV contrast 



Indication: Pneumonia, abdominal ascites 



Technique: 



Contiguous axial images of the chest, abdomen, and pelvis without 

oral or IV contrast. Coronal and Sagittal reformats generated and 

reviewed. 



This CT exam was performed using 1 or more of the falling dose 

reduction techniques: Automated exposure control, adjustment of the 

MAA and/or kV according to patient size, and/or use of iterative 

reconstruction technique. 



Radiation dose:



Total exam DLP = 1629.51 MGy-cm.



Comparison: Abdominal ultrasound performed 8/18/16 



Findings: 



Visualized portions of the inferior thyroid gland appear 

unremarkable. The unenhanced mediastinal and hilar vascular 

structures appear grossly unremarkable.  The heart appears within 

normal limits of size. Coronary artery calcifications. 



There is bullous change at the apices. Extensive patchy infiltrates 

throughout the right lauren thorax. Bibasilar atelectasis/infiltrates. 



Small hiatal hernia/distal esophageal wall thickening. 



Hepatomegaly.  Left hepatic lobe calcifications, likely granuloma. 

Borderline splenomegaly. The unenhanced adrenal glands and pancreas 

appear unremarkable. Contracted gallbladder appears otherwise 

unremarkable. No evidence of hydronephrosis or obstructing calculus. 



The stomach is nondistended.



Lack of oral contrast limits evaluation for bowel pathology. The 

bowel loops appear within normal limits of caliber without evidence 

of intestinal obstruction. The appendix appears within normal limits 

of caliber.  No secondary signs of acute appendicitis. There is no 

definite free air. 



The prostate gland measures approximately 4.7 x 5.4 cm and contains 

calcifications. Penile prosthesis. The urinary bladder appears 

unremarkable. 



No abdominal or pelvic ascites. 



Inferior to the scapula at approximately the level of the right 7th 

rib is a 2.8 x 5.0 cm right axillary mass. Abnormal appearance of the 

right 6th rib, indeterminate. 



Degenerative changes of the spine. 



Impression: 



Bullous change at the apices. Extensive patchy infiltrates throughout 

the right lauren thorax. Bibasilar atelectasis/infiltrates. Recommend 

clinical correlation and follow-up to resolution. 



Indeterminate 2.8 x 5.0 cm right axillary mass located inferior to 

the scapula at approximately the level of the right 7th rib. Abnormal 

appearance of the right 6th rib, indeterminate. 



Hepatosplenomegaly. 



No abdominal or pelvic ascites evident. 



Enlarged prostate gland.  Recommend correlation with PSA. 



Additional findings as above.

## 2017-04-18 NOTE — CT
PROCEDURE:  CT Angiography Abdomen, Pelvis and Lower Extremity with 

Contrast



HISTORY:

claudication



COMPARISON:

None.



TECHNIQUE:

Technique: CT angiography of the abdomen, pelvis and bilateral lower 

extremities performed in the arterial phase of enhancement. Coronal 

and sagittal reformats, and well as rotating MIP images of the 

vessels generated at the workstation.



Intravenous contrast dose: 150 milliliters Visipaque 320



Radiation dose:



Total exam DLP = 2575.55 MGy-cm.



This CT exam was performed using one or more of the following dose 

reduction techniques: Automated exposure control, adjustment of the 

mA and/or kV according to patient size, and/or use of iterative 

reconstruction technique.



FINDINGS:



CT ANGIOGRAPHY:



ABDOMINAL AORTA::

 Unremarkable 



MAJOR AORTIC BRANCHES:

Celiac Axis: Mild calcific plaque with no stenosis.



Superior mesenteric artery: Moderate calcific plaque with about 40 

percent stenosis proximal SFA including and origin.



Inferior mesenteric artery: Unremarkable.  



Renal arteries: Unremarkable.



PELVIC ARTERIES:

Right Common Iliac: Mild calcific plaque otherwise unremarkable



Right External Iliac:  Unremarkable.



Right Internal Iliac:  Unremarkable.



Left Common Iliac: Mild calcific plaque otherwise unremarkable



Left External Iliac: Unremarkable.



Left Internal Iliac: Unremarkable.



RIGHT LOWER EXTREMITY ARTERIES:

Right Common Femoral: Unremarkable.



Right Superficial Femoral: Minimal stenosis throughout the SFA. 

Distal SFA stent is patent.  There is mild InStent stenosis.



Right Profunda Femoris: Unremarkable.



Right Popliteal:Unremarkable.



Right Anterior Tibial: Patent. Slight tortuosity in the proximal 

segment.



Right Tibioperoneal Trunk:  Unremarkable.



Right Posterior Tibial:  Unremarkable.



Right Peroneal: Unremarkable.



Right dorsalis pedis :  Unremarkable.



LEFT LOWER EXTREMITY ARTERIES:

Left Common Femoral:  Unremarkable.



Left Superficial Femoral: SFA is diffusely mildly stenotic. Distal 

SFA stent is patent with no significant InStent stenosis.



Left Profunda Femoris: Unremarkable.



Left Popliteal: Unremarkable



Left Anterior Tibial: Mild calcific plaque in the proximal segment 

which limits its evaluation.  Otherwise patent.



Left Tibioperoneal Trunk: Unremarkable.



Left Posterior Tibial:  Unremarkable.



Left Peroneal: Multiple areas of calcific plaque with possible 

stenosis. Peroneal artery is patent distally.



Left Dorsalis pedis: Unremarkable.



NON-ANGIOGRAPHIC ASPECT OF THE EXAM:



LOWER THORAX:

Patchy airspace opacity in the right lower lobe. 



LIVER:

Unremarkable. No gross lesion or ductal dilatation. 



GALLBLADDER AND BILE DUCTS:

Unremarkable. 



PANCREAS:

Unremarkable. No gross lesion or ductal dilatation.



SPLEEN:

Unremarkable. 



ADRENALS:

Unremarkable. No mass. 



KIDNEYS AND URETERS:

Unremarkable. No hydronephrosis. No solid mass. 



STOMACH AND BOWEL:

Unremarkable. No obstruction. No gross mural thickening. 



APPENDIX:

Normal appendix. 



PERITONEUM:

Unremarkable. No free fluid. No free air. 



LYMPH NODES:

Unremarkable. No enlarged lymph nodes. 



BLADDER:

Unremarkable. 



REPRODUCTIVE:

Unremarkable. 



BONES:

No acute fracture. 



OTHER FINDINGS:

None.



IMPRESSION:

CT ABDOMEN/PELVIS:



1. Unremarkable aorta and pelvic arteries. . 



2. Moderate stenosis of the proximal and superior mesenteric artery. 



RIGHT LOWER EXTREMITY CT ANGIOGRAM



1. Distal SFA stent is patent with mild InStent stenosis. 



2. Popliteal artery, common femoral artery, profunda femoral artery 

are normal. 



3. Runoff shows 3 vessel runoff. 



LEFT LOWER EXTREMITY CT ANGIOGRAM



1. Distal SFA stent is patent with mild instent.



2. Remarkable common femoral artery and popliteal artery. 



3. Runoff shows patent anterior tibial artery and posterior tibial 

artery.  Possible areas of stenosis within the proximal peroneal 

artery.  Calcific plaque in this area limits evaluation

## 2017-04-19 LAB
ALBUMIN/GLOB SERPL: 1.1 {RATIO} (ref 1–2.1)
ALP SERPL-CCNC: 76 U/L (ref 38–126)
ALT SERPL-CCNC: 60 U/L (ref 21–72)
AST SERPL-CCNC: 19 U/L (ref 17–59)
BASOPHILS # BLD AUTO: 0.1 K/UL (ref 0–0.2)
BASOPHILS NFR BLD: 0.5 % (ref 0–2)
BILIRUB SERPL-MCNC: 0.6 MG/DL (ref 0.2–1.3)
BUN SERPL-MCNC: 20 MG/DL (ref 9–20)
CALCIUM SERPL-MCNC: 8.8 MG/DL (ref 8.6–10.4)
CHLORIDE SERPL-SCNC: 102 MMOL/L (ref 98–107)
CO2 SERPL-SCNC: 24 MMOL/L (ref 22–30)
EOSINOPHIL # BLD AUTO: 0 K/UL (ref 0–0.7)
EOSINOPHIL NFR BLD: 0.2 % (ref 0–4)
ERYTHROCYTE [DISTWIDTH] IN BLOOD BY AUTOMATED COUNT: 16.7 % (ref 11.5–14.5)
GLOBULIN SER-MCNC: 3.3 GM/DL (ref 2.2–3.9)
GLUCOSE SERPL-MCNC: 176 MG/DL (ref 75–110)
HCT VFR BLD CALC: 38.1 % (ref 35–51)
LYMPHOCYTES # BLD AUTO: 2.2 K/UL (ref 1–4.3)
LYMPHOCYTES NFR BLD AUTO: 11.2 % (ref 20–40)
MAGNESIUM SERPL-MCNC: 2 MG/DL (ref 1.6–2.3)
MCH RBC QN AUTO: 23.9 PG (ref 27–31)
MCHC RBC AUTO-ENTMCNC: 31.9 G/DL (ref 33–37)
MCV RBC AUTO: 74.7 FL (ref 80–94)
MONOCYTES # BLD: 1.3 K/UL (ref 0–0.8)
MONOCYTES NFR BLD: 6.6 % (ref 0–10)
NRBC BLD AUTO-RTO: 0.2 % (ref 0–2)
PHOSPHATE SERPL-MCNC: 4.4 MG/DL (ref 2.5–4.5)
PLATELET # BLD: 273 K/UL (ref 130–400)
PMV BLD AUTO: 9.4 FL (ref 7.2–11.7)
POTASSIUM SERPL-SCNC: 4.1 MMOL/L (ref 3.6–5.2)
PROT SERPL-MCNC: 7 G/DL (ref 6.3–8.3)
SODIUM SERPL-SCNC: 137 MMOL/L (ref 132–148)
WBC # BLD AUTO: 19.6 K/UL (ref 4.8–10.8)

## 2017-04-19 RX ADMIN — Medication SCH MG: at 09:08

## 2017-04-19 RX ADMIN — HUMAN INSULIN SCH UNIT: 100 INJECTION, SOLUTION SUBCUTANEOUS at 17:40

## 2017-04-19 RX ADMIN — IPRATROPIUM BROMIDE AND ALBUTEROL SULFATE SCH: .5; 3 SOLUTION RESPIRATORY (INHALATION) at 23:51

## 2017-04-19 RX ADMIN — HUMAN INSULIN SCH UNIT: 100 INJECTION, SOLUTION SUBCUTANEOUS at 12:11

## 2017-04-19 RX ADMIN — IPRATROPIUM BROMIDE AND ALBUTEROL SULFATE SCH ML: .5; 3 SOLUTION RESPIRATORY (INHALATION) at 19:55

## 2017-04-19 RX ADMIN — INSULIN ASPART SCH UNIT: 100 INJECTION, SOLUTION INTRAVENOUS; SUBCUTANEOUS at 17:39

## 2017-04-19 RX ADMIN — HUMAN INSULIN SCH: 100 INJECTION, SOLUTION SUBCUTANEOUS at 22:26

## 2017-04-19 RX ADMIN — INSULIN ASPART SCH: 100 INJECTION, SOLUTION INTRAVENOUS; SUBCUTANEOUS at 08:20

## 2017-04-19 RX ADMIN — IPRATROPIUM BROMIDE AND ALBUTEROL SULFATE SCH: .5; 3 SOLUTION RESPIRATORY (INHALATION) at 07:44

## 2017-04-19 RX ADMIN — IPRATROPIUM BROMIDE AND ALBUTEROL SULFATE SCH ML: .5; 3 SOLUTION RESPIRATORY (INHALATION) at 15:45

## 2017-04-19 RX ADMIN — IPRATROPIUM BROMIDE AND ALBUTEROL SULFATE SCH: .5; 3 SOLUTION RESPIRATORY (INHALATION) at 04:22

## 2017-04-19 RX ADMIN — INSULIN GLARGINE SCH UNITS: 100 INJECTION, SOLUTION SUBCUTANEOUS at 19:51

## 2017-04-19 RX ADMIN — HUMAN INSULIN SCH UNIT: 100 INJECTION, SOLUTION SUBCUTANEOUS at 08:19

## 2017-04-19 RX ADMIN — IPRATROPIUM BROMIDE AND ALBUTEROL SULFATE SCH: .5; 3 SOLUTION RESPIRATORY (INHALATION) at 11:12

## 2017-04-19 RX ADMIN — INSULIN ASPART SCH UNIT: 100 INJECTION, SOLUTION INTRAVENOUS; SUBCUTANEOUS at 13:30

## 2017-04-19 RX ADMIN — PANTOPRAZOLE SODIUM SCH MG: 40 TABLET, DELAYED RELEASE ORAL at 09:07

## 2017-04-19 RX ADMIN — POLYVINYL ALCOHOL PRN DROP: 14 SOLUTION/ DROPS OPHTHALMIC at 09:06

## 2017-04-19 RX ADMIN — Medication SCH MG: at 17:39

## 2017-04-19 RX ADMIN — INSULIN GLARGINE SCH UNITS: 100 INJECTION, SOLUTION SUBCUTANEOUS at 06:29

## 2017-04-19 RX ADMIN — IPRATROPIUM BROMIDE AND ALBUTEROL SULFATE SCH ML: .5; 3 SOLUTION RESPIRATORY (INHALATION) at 00:47
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
## 2017-04-19 NOTE — CP.PCM.PN
Addendum entered and electronically signed by John Jon DO  04/19/17 13:

09: 





DM: Lantus 20 mg sc q12h, insulin sliding scale; Novolog 10 units sc ac


HTN: HCTZ 12.5 mg po qd





Original Note:








<John Jon - Last Filed: 04/19/17 11:07>





Subjective





- Date & Time of Evaluation


Date of Evaluation: 04/19/17


Time of Evaluation: 07:09





- Subjective


Subjective: 


Pt seen and examined. Pt reports that he is feeling well. No acute events 

overnight reported. Pt denies fever, chills, chest pain, shortness of breath, 

nausea, and vomiting. 





Objective





- Vital Signs/Intake and Output


Vital Signs (last 24 hours): 


 











Temp Pulse Resp BP Pulse Ox


 


 97.6 F   93 H  20   162/85 H  95 


 


 04/19/17 07:05  04/19/17 07:05  04/19/17 07:05  04/19/17 09:06  04/19/17 07:05








Intake and Output: 


 











 04/19/17 04/19/17





 06:59 18:59


 


Output Total 200 


 


Balance -200 














- Medications


Medications: 


 Current Medications





Albuterol/Ipratropium (Duoneb 3 Mg/0.5 Mg (3 Ml) Ud)  3 ml INH RQ4 [facility]


   Last Admin: 04/19/17 07:44 Dose:  Not Given


Artificial Tears (Artificial Tears)  1 ml OU Q4 PRN


   PRN Reason: Dry eyes


   Last Admin: 04/19/17 09:06 Dose:  2 drop


Aspirin (Ecotrin)  81 mg PO DAILY [facility]


   Last Admin: 04/19/17 09:08 Dose:  81 mg


Docusate Sodium (Colace)  100 mg PO DAILY [facility]


   Last Admin: 04/19/17 09:08 Dose:  100 mg


Furosemide (Lasix)  20 mg IVP DAILY [facility]


   Last Admin: 04/19/17 09:06 Dose:  20 mg


Heparin Sodium (Porcine) (Heparin)  5,000 units SC Q8 [facility]


   Last Admin: 04/19/17 06:29 Dose:  5,000 units


Hydrochlorothiazide (Microzide)  12.5 mg PO DAILY [facility]


   Last Admin: 04/19/17 09:07 Dose:  12.5 mg


Vancomycin HCl 1,000 mg/ (Sodium Chloride)  250 mls @ 166.6 mls/hr IVPB Q12H [facility]


   Last Admin: 04/19/17 00:24 Dose:  166.6 mls/hr


Azithromycin 500 mg/ Sodium (Chloride)  250 mls @ 166.667 mls/hr IVPB Q24H [facility]


   Last Admin: 04/18/17 18:51 Dose:  166.667 mls/hr


Imipenem/Cilastatin Sodium 500 (mg/ Sodium Chloride)  100 mls @ 100 mls/hr IVPB 

Q6H [facility]


   Last Admin: 04/19/17 09:05 Dose:  100 mls/hr


Insulin Aspart (Novolog)  10 unit SC AC [facility]


   Last Admin: 04/19/17 08:20 Dose:  Not Given


Insulin Glargine (Lantus)  20 unit SC Q12H [facility]


   Last Admin: 04/19/17 06:29 Dose:  20 units


Insulin Human Regular (Novolin R)  0 unit SC ACHS [facility]


   PRN Reason: Protocol


   Last Admin: 04/19/17 08:19 Dose:  2 unit


Lactulose (Enulose)  20 gm PO Q4H [facility]


   Last Admin: 04/19/17 09:08 Dose:  Not Given


Losartan Potassium (Cozaar)  50 mg PO DAILY [facility]


   Last Admin: 04/19/17 09:07 Dose:  50 mg


Ondansetron HCl (Zofran Inj)  4 mg IVP Q6 PRN


   PRN Reason: Nausea/Vomiting


Pantoprazole Sodium (Protonix Ec Tab)  40 mg PO DAILY [facility]


   Last Admin: 04/19/17 09:07 Dose:  40 mg


Rosuvastatin Calcium (Crestor)  10 mg PO HS [facility]


   Last Admin: 04/18/17 21:47 Dose:  10 mg


Saccharomyces Boulardii (Florastor)  250 mg PO BID [facility]


   Last Admin: 04/19/17 09:08 Dose:  250 mg


Zolpidem Tartrate (Ambien)  5 mg PO HS PRN


   PRN Reason: Insomnia


   Last Admin: 04/19/17 00:24 Dose:  5 mg











- Labs


Labs: 


 





 04/19/17 07:20 





 04/19/17 07:20 





 











PT  14.2 SECONDS (9.7-12.2)  H  04/17/17  07:08    


 


INR  1.3   04/17/17  07:08    


 


APTT  28 SECONDS (21-34)   04/17/17  07:08    














- Constitutional


Appears: No Acute Distress





- Head Exam


Head Exam: ATRAUMATIC





- Eye Exam


Eye Exam: EOMI, PERRL





- ENT Exam


ENT Exam: Mucous Membranes Moist.  absent: Mucous Membranes Dry





- Neck Exam


Neck Exam: Full ROM.  absent: Lymphadenopathy





- Respiratory Exam


Respiratory Exam: Decreased Breath Sounds.  absent: Rhonchi, Wheezes


Additional comments: 


Diminished breath sounds on right lung fields





- Cardiovascular Exam


Cardiovascular Exam: +S1, +S2.  absent: Gallop, Rubs





- GI/Abdominal Exam


GI & Abdominal Exam: Distended, Soft.  absent: Guarding, Tenderness





- Extremities Exam


Extremities Exam: Full ROM.  absent: Pedal Edema





- Neurological Exam


Neurological Exam: Alert, Awake, Oriented x3





- Psychiatric Exam


Psychiatric exam: Normal Affect, Normal Mood





- Skin


Skin Exam: Normal Color, Warm





Assessment and Plan





- Assessment and Plan (Free Text)


Assessment: 


Sepsis:


WBC 19.6


HR -93


Afebrile


Band neutrophils -21, improved


Lactate 2.0


Sputum cultures pending


Blood cultures no growth after 48 hours


Urine no cultures no growth


Primaxin 250 mg IV q6h


Vancomycin 1 gm IV q12h


Azithromycin 500 mg IV qd





Pneumonia:


Initial CXR - consolidation involving Right lauren diaphragm (inferior RUL, RML, 

and RLL) and small right pleural effusion (see full report)


Repeat CXR - biapical pleural thickening, prominent right air space 

consolidative changes inthe right mid to lower lung zone with associated small 

right pleural effusion. Diffuse increased interstitial lung markings (please 

see full report)


WBC 19.6


HR - 93 bpm


Band neutrophils -21, improved


Lactic Acid 2.8


Procalcitonin 14.7


Primaxin 500 mg IV q6h


Vancomycin 1 gm IV q12h


Azithromycin 500 mg IV qd


Chest/Abd/Pelvis CT w/o contrast pending


Infectious Disease, Dr. Finley, consulted. Help appreciated.


Chest/Abd/Pelvis CT w/o contrast - extensive patchy infiltrates throughout the 

right hemithorax; bibasilar ateletasis, infilrates; indeterminate right 

axillary mass; no abdominal or pelvic ascites; enlarged prostate gland (please 

see full report)


Pulmonology, Dr. Henry, consulted. Help appreciated. 





Elevated D-Dimer:


D-dimer - 862


V/Q scan - low probability for PE (please see full report)


B/L lower extremity venous dopplers- no evidence of DVTs bilaterally (please 

see full report)





Peripheral Vascular Disease:


Vascular Surgery, Dr. Kaiser, consulted. Help appreciated.


Angiography ileofemoral runoff - distal SFA patent patient with mild instent; 

remarkable common femoral artery and posterior tibial artery; possible areas of 

stenosis within the proximal peroneal artery (please see full report)





CHF:


Cardiology, Dr. Escobedo, consulted. Help appreciated.


Echo pending


Troponins x 30.0220, 0.0600, 0.0390


Pro-BNP 48


Lasin 20 mg po qd





COPD:


Duonebs prn





HTN:


Home medications held due to sepsis; maintain blood pressure





Diabetes Mellitus:


Insulin sliding scale


Accuchecks





Coronary artery disease:


ASA 81 mg PO daily


Crestor 10 mg PO HS





Hyperlipidemia:


Crestor 10 mg PO HS





Prophylactic Measures:


DVT: Heparin 5,000 units SC Q8H, SCDs contraindicated due to lower extremity 

pain 


GI: Protonix 40 mg PO daily








<Jorge Tripp - Last Filed: 04/19/17 16:22>





Objective





- Vital Signs/Intake and Output


Vital Signs (last 24 hours): 


 











Temp Pulse Resp BP Pulse Ox


 


 97.6 F   97 H  20   162/85 H  95 


 


 04/19/17 07:05  04/19/17 09:00  04/19/17 07:05  04/19/17 09:06  04/19/17 07:05








Intake and Output: 


 











 04/19/17 04/19/17





 06:59 18:59


 


Output Total 200 


 


Balance -200 














- Medications


Medications: 


 Current Medications





Albuterol/Ipratropium (Duoneb 3 Mg/0.5 Mg (3 Ml) Ud)  3 ml INH RQ4 [facility]


   Last Admin: 04/19/17 15:45 Dose:  3 ml


Artificial Tears (Artificial Tears)  1 ml OU Q4 PRN


   PRN Reason: Dry eyes


   Last Admin: 04/19/17 09:06 Dose:  2 drop


Aspirin (Ecotrin)  81 mg PO DAILY [facility]


   Last Admin: 04/19/17 09:08 Dose:  81 mg


Docusate Sodium (Colace)  100 mg PO DAILY [facility]


   Last Admin: 04/19/17 09:08 Dose:  100 mg


Furosemide (Lasix)  20 mg IVP DAILY [facility]


   Last Admin: 04/19/17 09:06 Dose:  20 mg


Heparin Sodium (Porcine) (Heparin)  5,000 units SC Q8 [facility]


   Last Admin: 04/19/17 13:31 Dose:  5,000 units


Hydrochlorothiazide (Microzide)  12.5 mg PO DAILY [facility]


   Last Admin: 04/19/17 09:07 Dose:  12.5 mg


Vancomycin HCl 1,000 mg/ (Sodium Chloride)  250 mls @ 166.6 mls/hr IVPB Q12H [facility]


   Last Admin: 04/19/17 15:31 Dose:  166.6 mls/hr


Azithromycin 500 mg/ Sodium (Chloride)  250 mls @ 166.667 mls/hr IVPB Q24H [facility]


   Last Admin: 04/18/17 18:51 Dose:  166.667 mls/hr


Imipenem/Cilastatin Sodium 500 (mg/ Sodium Chloride)  100 mls @ 100 mls/hr IVPB 

Q6H [facility]


   Last Admin: 04/19/17 15:37 Dose:  100 mls/hr


Insulin Aspart (Novolog)  10 unit SC AC [facility]


   Last Admin: 04/19/17 13:30 Dose:  10 unit


Insulin Glargine (Lantus)  20 unit SC Q12H [facility]


   Last Admin: 04/19/17 06:29 Dose:  20 units


Insulin Human Regular (Novolin R)  0 unit SC ACHS [facility]


   PRN Reason: Protocol


   Last Admin: 04/19/17 12:11 Dose:  8 unit


Lactulose (Enulose)  20 gm PO Q4H [facility]


   Last Admin: 04/19/17 09:08 Dose:  Not Given


Losartan Potassium (Cozaar)  50 mg PO DAILY [facility]


   Last Admin: 04/19/17 09:07 Dose:  50 mg


Ondansetron HCl (Zofran Inj)  4 mg IVP Q6 PRN


   PRN Reason: Nausea/Vomiting


Pantoprazole Sodium (Protonix Ec Tab)  40 mg PO DAILY [facility]


   Last Admin: 04/19/17 09:07 Dose:  40 mg


Rosuvastatin Calcium (Crestor)  10 mg PO HS [facility]


   Last Admin: 04/18/17 21:47 Dose:  10 mg


Saccharomyces Boulardii (Florastor)  250 mg PO BID [facility]


   Last Admin: 04/19/17 09:08 Dose:  250 mg


Zolpidem Tartrate (Ambien)  5 mg PO HS PRN


   PRN Reason: Insomnia


   Last Admin: 04/19/17 00:24 Dose:  5 mg











- Labs


Labs: 


 





 04/19/17 07:20 





 04/19/17 07:20 





 











PT  14.2 SECONDS (9.7-12.2)  H  04/17/17  07:08    


 


INR  1.3   04/17/17  07:08    


 


APTT  28 SECONDS (21-34)   04/17/17  07:08    














Attending/Attestation





- Attestation


I have personally seen and examined this patient.: Yes


I have fully participated in the care of the patient.: Yes


I have reviewed all pertinent clinical information, including history, physical 

exam and plan: Yes


Notes (Text): 


Medical Attending: Patient was seen and examined by me. Agree with the above 

note by the resident. 


The patient's WBC is decreased, and L shift decreased. Currently there was no 

bandemia reported today. 





He is afebrile, continue with the IV abx, the cultures remain negative at this 

moment. 


The patient was ambulating in the room and was able to be out of bed to chair - 

he looked better today despite his still serious lab work. 





The patient reports his shortness of breath and cough is now a lot better





Also we are further adjusting his DM medication as well.





thank you


Jorge Tripp





04/19/17 16:22

## 2017-04-19 NOTE — CP.PCM.PN
Subjective





- Date & Time of Evaluation


Date of Evaluation: 04/19/17


Time of Evaluation: 02:00





- Subjective


Subjective: 


dictated





Objective





- Vital Signs/Intake and Output


Vital Signs (last 24 hours): 


 











Temp Pulse Resp BP Pulse Ox


 


 98.0 F   100 H  20   140/78   94 L


 


 04/19/17 15:06  04/19/17 15:06  04/19/17 15:06  04/19/17 15:06  04/19/17 15:06











- Medications


Medications: 


 Current Medications





Albuterol/Ipratropium (Duoneb 3 Mg/0.5 Mg (3 Ml) Ud)  3 ml INH RQ4 [facility]


   Last Admin: 04/19/17 19:55 Dose:  3 ml


Artificial Tears (Artificial Tears)  1 ml OU Q4 PRN


   PRN Reason: Dry eyes


   Last Admin: 04/19/17 09:06 Dose:  2 drop


Aspirin (Ecotrin)  81 mg PO DAILY [facility]


   Last Admin: 04/19/17 09:08 Dose:  81 mg


Docusate Sodium (Colace)  100 mg PO DAILY [facility]


   Last Admin: 04/19/17 09:08 Dose:  100 mg


Furosemide (Lasix)  20 mg IVP DAILY [facility]


   Last Admin: 04/19/17 09:06 Dose:  20 mg


Heparin Sodium (Porcine) (Heparin)  5,000 units SC Q8 [facility]


   Last Admin: 04/19/17 13:31 Dose:  5,000 units


Hydrochlorothiazide (Microzide)  12.5 mg PO DAILY [facility]


   Last Admin: 04/19/17 09:07 Dose:  12.5 mg


Vancomycin HCl 1,000 mg/ (Sodium Chloride)  250 mls @ 166.6 mls/hr IVPB Q12H [facility]


   Last Admin: 04/19/17 15:31 Dose:  166.6 mls/hr


Azithromycin 500 mg/ Sodium (Chloride)  250 mls @ 166.667 mls/hr IVPB Q24H [facility]


   Last Admin: 04/19/17 19:51 Dose:  166.667 mls/hr


Imipenem/Cilastatin Sodium 500 (mg/ Sodium Chloride)  100 mls @ 100 mls/hr IVPB 

Q6H [facility]


   Last Admin: 04/19/17 15:37 Dose:  100 mls/hr


Insulin Aspart (Novolog)  10 unit SC AC [facility]


   Last Admin: 04/19/17 17:39 Dose:  10 unit


Insulin Glargine (Lantus)  20 unit SC Q12H [facility]


   Last Admin: 04/19/17 19:51 Dose:  20 units


Insulin Human Regular (Novolin R)  0 unit SC ACHS [facility]


   PRN Reason: Protocol


   Last Admin: 04/19/17 17:40 Dose:  4 unit


Lactulose (Enulose)  20 gm PO Q4H [facility]


   Last Admin: 04/19/17 17:43 Dose:  20 gm


Losartan Potassium (Cozaar)  50 mg PO DAILY [facility]


   Last Admin: 04/19/17 09:07 Dose:  50 mg


Ondansetron HCl (Zofran Inj)  4 mg IVP Q6 PRN


   PRN Reason: Nausea/Vomiting


Pantoprazole Sodium (Protonix Ec Tab)  40 mg PO DAILY [facility]


   Last Admin: 04/19/17 09:07 Dose:  40 mg


Rosuvastatin Calcium (Crestor)  10 mg PO HS [facility]


   Last Admin: 04/18/17 21:47 Dose:  10 mg


Saccharomyces Boulardii (Florastor)  250 mg PO BID [facility]


   Last Admin: 04/19/17 17:39 Dose:  250 mg


Zolpidem Tartrate (Ambien)  5 mg PO HS PRN


   PRN Reason: Insomnia


   Last Admin: 04/19/17 00:24 Dose:  5 mg











- Labs


Labs: 


 





 04/19/17 07:20 





 04/19/17 07:20 





 











PT  14.2 SECONDS (9.7-12.2)  H  04/17/17  07:08    


 


INR  1.3   04/17/17  07:08    


 


APTT  28 SECONDS (21-34)   04/17/17  07:08

## 2017-04-19 NOTE — PN
DATE: 04/19/2017



SUBJECTIVE:  The patient was seen today.  He was saying he has no shortness of breath, not coughing a
s much.  He was breathing easier.  Denied any nausea or vomiting.  No chest pain.  He also had a walk
er at the bedside. He had no new complaints; however, he was worried about his kidneys as he is antib
iotics.  He says, "I hope the kidneys remain okay." We will follow up closely with the blood tests.



PHYSICAL EXAMINATION

VITAL SIGNS:  T-max is 98, heart rate of 100, blood pressure 162/85, respirations are 20.

GENERAL:  He is obese.

HEENT:  Head is atraumatic, normocephalic.

NECK:  Supple.

LUNGS:  Clear. No wheeze, no rhonchi present.  Decreased breath sounds bilaterally.

HEART:  S1, S2 regular.  No murmurs appreciated.

ABDOMEN:  Soft, flabby, prominent.  No guarding, no rigidity. 

EXTREMITIES:  Have trace edema present.



LABORATORY DATA:  Noted.  Labs show white count is 19.6, remains high. Hemoglobin is 12.2, hematocrit
 38.1, platelet count is 273.  Sodium 137, potassium 4.1, chloride 102, CO2 is 24, creatinine is 0.9.
  His chemistry noted.  Micro-wise, his blood cultures, sputum culture, urine culture -- all cultures
 came out negative. 



ASSESSMENT AND PLAN: I want to make sure we ordered serologies. He is on vancomycin, Zithromax and im
ipenem at this time, and I do not see that serology is ordered, so we will order for serologies to ru
le out strep pneumo and legionella and mycoplasma and will continue present treatment at this time.  
He does remain with severe bandemia and will follow and has a high white count, still remains with se
psis and pneumonia.





__________________________________________

Patricia Finley MD







cc:



DD: 04/19/2017 22:02:32  [id number]

TT: 04/19/2017 23:12:06

Confirmation # [id number]

Dictation # [id number]

ln

## 2017-04-19 NOTE — CP.PCM.PN
<Gennaro Mar - Last Filed: 04/19/17 10:26>





Subjective





- Date & Time of Evaluation


Date of Evaluation: 04/19/17


Time of Evaluation: 10:26





- Subjective


Subjective: 


Cardiology Progress Note


Dr. Escobedo








Patient seen and examined at the bedside.  No acute distress.  No acute events 

overnight.  Nursing staff reports no issues.  The patient is resting 

comfortably in bedside chair.  The patient denies all cardiopulmonary 

complaints.  12 point review of systems was preformed and the patient denied 

all complaints at this time aside from abdominal pain.





Objective





- Vital Signs/Intake and Output


Vital Signs (last 24 hours): 


 











Temp Pulse Resp BP Pulse Ox


 


 97.6 F   93 H  20   162/85 H  95 


 


 04/19/17 07:05  04/19/17 07:05  04/19/17 07:05  04/19/17 09:06  04/19/17 07:05








Intake and Output: 


 











 04/19/17 04/19/17





 06:59 18:59


 


Output Total 200 


 


Balance -200 














- Medications


Medications: 


 Current Medications





Albuterol/Ipratropium (Duoneb 3 Mg/0.5 Mg (3 Ml) Ud)  3 ml INH RQ4 [facility]


   Last Admin: 04/19/17 07:44 Dose:  Not Given


Artificial Tears (Artificial Tears)  1 ml OU Q4 PRN


   PRN Reason: Dry eyes


   Last Admin: 04/19/17 09:06 Dose:  2 drop


Aspirin (Ecotrin)  81 mg PO DAILY [facility]


   Last Admin: 04/19/17 09:08 Dose:  81 mg


Docusate Sodium (Colace)  100 mg PO DAILY [facility]


   Last Admin: 04/19/17 09:08 Dose:  100 mg


Furosemide (Lasix)  20 mg IVP DAILY [facility]


   Last Admin: 04/19/17 09:06 Dose:  20 mg


Heparin Sodium (Porcine) (Heparin)  5,000 units SC Q8 [facility]


   Last Admin: 04/19/17 06:29 Dose:  5,000 units


Hydrochlorothiazide (Microzide)  12.5 mg PO DAILY [facility]


   Last Admin: 04/19/17 09:07 Dose:  12.5 mg


Vancomycin HCl 1,000 mg/ (Sodium Chloride)  250 mls @ 166.6 mls/hr IVPB Q12H [facility]


   Last Admin: 04/19/17 00:24 Dose:  166.6 mls/hr


Azithromycin 500 mg/ Sodium (Chloride)  250 mls @ 166.667 mls/hr IVPB Q24H [facility]


   Last Admin: 04/18/17 18:51 Dose:  166.667 mls/hr


Imipenem/Cilastatin Sodium 500 (mg/ Sodium Chloride)  100 mls @ 100 mls/hr IVPB 

Q6H [facility]


   Last Admin: 04/19/17 09:05 Dose:  100 mls/hr


Insulin Aspart (Novolog)  10 unit SC AC [facility]


   Last Admin: 04/19/17 08:20 Dose:  Not Given


Insulin Glargine (Lantus)  20 unit SC Q12H [facility]


   Last Admin: 04/19/17 06:29 Dose:  20 units


Insulin Human Regular (Novolin R)  0 unit SC ACHS [facility]


   PRN Reason: Protocol


   Last Admin: 04/19/17 08:19 Dose:  2 unit


Lactulose (Enulose)  20 gm PO Q4H [facility]


   Last Admin: 04/19/17 09:08 Dose:  Not Given


Losartan Potassium (Cozaar)  50 mg PO DAILY [facility]


   Last Admin: 04/19/17 09:07 Dose:  50 mg


Ondansetron HCl (Zofran Inj)  4 mg IVP Q6 PRN


   PRN Reason: Nausea/Vomiting


Pantoprazole Sodium (Protonix Ec Tab)  40 mg PO DAILY [facility]


   Last Admin: 04/19/17 09:07 Dose:  40 mg


Rosuvastatin Calcium (Crestor)  10 mg PO HS [facility]


   Last Admin: 04/18/17 21:47 Dose:  10 mg


Saccharomyces Boulardii (Florastor)  250 mg PO BID [facility]


   Last Admin: 04/19/17 09:08 Dose:  250 mg


Zolpidem Tartrate (Ambien)  5 mg PO HS PRN


   PRN Reason: Insomnia


   Last Admin: 04/19/17 00:24 Dose:  5 mg











- Labs


Labs: 


 





 04/19/17 07:20 





 04/19/17 07:20 





 











PT  14.2 SECONDS (9.7-12.2)  H  04/17/17  07:08    


 


INR  1.3   04/17/17  07:08    


 


APTT  28 SECONDS (21-34)   04/17/17  07:08    














- Additional Findings


Additional findings: 


- Constitutional


Appears: No Acute Distress





- Head Exam


Head Exam: ATRAUMATIC, NORMAL INSPECTION, NORMOCEPHALIC





- Eye Exam


Eye Exam: EOMI, Normal appearance





- ENT Exam


ENT Exam: Mucous Membranes Moist





- Neck Exam


Neck exam: Positive for: Full Rom, Normal Inspection.  Negative for: 

Lymphadenopathy





- Respiratory Exam


Respiratory Exam: Decreased Breath Sounds (Decreased on the Right Base > Mid 

lung field ), NORMAL BREATHING PATTERN.  absent: Accessory Muscle Use, Clear to 

Auscultation Bilateral, Rhonchi, Wheezes, Respiratory Distress





- Cardiovascular Exam


Cardiovascular Exam: REGULAR RHYTHM, RRR, +S1, +S2.  absent: Diastolic murmur, 

Systolic Murmur





- GI/Abdominal Exam


GI & Abdominal Exam: Distended, Normal Bowel Sounds, Soft.  absent: Bruit, 

Guarding, Rebound, Rigid, Tenderness





- Extremities Exam


Extremities exam: Positive for: calf tenderness (B/L), normal inspection, pedal 

edema (1+ B/L), tenderness, pedal pulses present.  Negative for: joint swelling





- Neurological Exam


Neurological exam: Alert, CN II-XII Intact, Oriented x3, Normal Gait (patient 

ambulates with walker, gait is non-antalgic and without hesitancy) 





- Skin


Skin Exam: Dry, Intact, Normal Color, Warm





Assessment and Plan


(1) Diastolic CHF


Assessment & Plan: 


4/17/17 Echo- LV EF- 71%, diastolic dysfunction ,dilated LA, no valvular 

abnormalities


Lasix 20mg IV daily


Gt hose stockings





Continue:


   - aspirin 81mg po daily


   - Crestor 10mg po hs


4/16/17 EKG- Sinus Tachycardia, Normal AK, widened QRS and prolonged QTc, Right 

axis, Right bundle branch block


8/17/16 Echo- LV EF 75%


9/19/15 Echo- LV EF 80% diastolic dysfunction








Case discussed with Dr. Fanny Mar PGY1


Status: h   





<Kojo Escobedo - Last Filed: 05/29/17 18:04>





Objective





- Vital Signs/Intake and Output


Vital Signs (last 24 hours): 


 











Temp Pulse Resp BP Pulse Ox


 


 97.8 F   98 H  20   133/70   94 L


 


 04/21/17 07:10  04/21/17 07:10  04/21/17 07:10  04/21/17 09:36  04/23/17 09:19











- Labs


Labs: 


 





 04/21/17 07:17 





 04/21/17 07:17 





 











PT  14.2 SECONDS (9.7-12.2)  H  04/17/17  07:08    


 


INR  1.3   04/17/17  07:08    


 


APTT  28 SECONDS (21-34)   04/17/17  07:08    














Attending/Attestation





- Attestation


I have personally seen and examined this patient.: Yes


I have fully participated in the care of the patient.: Yes


I have reviewed all pertinent clinical information, including history, physical 

exam and plan: Yes


Notes (Text): 





05/29/17 18:04


continue asa and crestor

## 2017-04-19 NOTE — CP.PCM.PN
Subjective





- Date & Time of Evaluation


Date of Evaluation: 04/19/17


Time of Evaluation: 10:50





- Subjective


Subjective: 


Pt seen and examined sitting in chair.  Pt denies SOB, chest pain, chest pain 

with breathing, nausea, vomiting, abdominal pain, and constipation (passed 

stool today).





Objective





- Vital Signs/Intake and Output


Vital Signs (last 24 hours): 


 











Temp Pulse Resp BP Pulse Ox


 


 97.6 F   93 H  20   162/85 H  95 


 


 04/19/17 07:05  04/19/17 07:05  04/19/17 07:05  04/19/17 09:06  04/19/17 07:05








Intake and Output: 


 











 04/19/17 04/19/17





 06:59 18:59


 


Output Total 200 


 


Balance -200 














- Medications


Medications: 


 Current Medications





Albuterol/Ipratropium (Duoneb 3 Mg/0.5 Mg (3 Ml) Ud)  3 ml INH RQ4 [facility]


   Last Admin: 04/19/17 11:12 Dose:  Not Given


Artificial Tears (Artificial Tears)  1 ml OU Q4 PRN


   PRN Reason: Dry eyes


   Last Admin: 04/19/17 09:06 Dose:  2 drop


Aspirin (Ecotrin)  81 mg PO DAILY [facility]


   Last Admin: 04/19/17 09:08 Dose:  81 mg


Docusate Sodium (Colace)  100 mg PO DAILY [facility]


   Last Admin: 04/19/17 09:08 Dose:  100 mg


Furosemide (Lasix)  20 mg IVP DAILY [facility]


   Last Admin: 04/19/17 09:06 Dose:  20 mg


Heparin Sodium (Porcine) (Heparin)  5,000 units SC Q8 [facility]


   Last Admin: 04/19/17 06:29 Dose:  5,000 units


Hydrochlorothiazide (Microzide)  12.5 mg PO DAILY [facility]


   Last Admin: 04/19/17 09:07 Dose:  12.5 mg


Vancomycin HCl 1,000 mg/ (Sodium Chloride)  250 mls @ 166.6 mls/hr IVPB Q12H [facility]


   Last Admin: 04/19/17 00:24 Dose:  166.6 mls/hr


Azithromycin 500 mg/ Sodium (Chloride)  250 mls @ 166.667 mls/hr IVPB Q24H [facility]


   Last Admin: 04/18/17 18:51 Dose:  166.667 mls/hr


Imipenem/Cilastatin Sodium 500 (mg/ Sodium Chloride)  100 mls @ 100 mls/hr IVPB 

Q6H [facility]


   Last Admin: 04/19/17 09:05 Dose:  100 mls/hr


Insulin Aspart (Novolog)  10 unit SC AC [facility]


   Last Admin: 04/19/17 08:20 Dose:  Not Given


Insulin Glargine (Lantus)  20 unit SC Q12H [facility]


   Last Admin: 04/19/17 06:29 Dose:  20 units


Insulin Human Regular (Novolin R)  0 unit SC ACHS [facility]


   PRN Reason: Protocol


   Last Admin: 04/19/17 08:19 Dose:  2 unit


Lactulose (Enulose)  20 gm PO Q4H [facility]


   Last Admin: 04/19/17 09:08 Dose:  Not Given


Losartan Potassium (Cozaar)  50 mg PO DAILY [facility]


   Last Admin: 04/19/17 09:07 Dose:  50 mg


Ondansetron HCl (Zofran Inj)  4 mg IVP Q6 PRN


   PRN Reason: Nausea/Vomiting


Pantoprazole Sodium (Protonix Ec Tab)  40 mg PO DAILY [facility]


   Last Admin: 04/19/17 09:07 Dose:  40 mg


Rosuvastatin Calcium (Crestor)  10 mg PO HS [facility]


   Last Admin: 04/18/17 21:47 Dose:  10 mg


Saccharomyces Boulardii (Florastor)  250 mg PO BID [facility]


   Last Admin: 04/19/17 09:08 Dose:  250 mg


Zolpidem Tartrate (Ambien)  5 mg PO HS PRN


   PRN Reason: Insomnia


   Last Admin: 04/19/17 00:24 Dose:  5 mg











- Labs


Labs: 


 





 04/19/17 07:20 





 04/19/17 07:20 





 











PT  14.2 SECONDS (9.7-12.2)  H  04/17/17  07:08    


 


INR  1.3   04/17/17  07:08    


 


APTT  28 SECONDS (21-34)   04/17/17  07:08    














- Constitutional


Appears: Well, No Acute Distress





- Head Exam


Head Exam: ATRAUMATIC, NORMOCEPHALIC





- Respiratory Exam


Respiratory Exam: Decreased Breath Sounds (R reduced air sounds anteriorly), 

Rhonchi (R rhonchi middle and lower posterior, L lower anterior), NORMAL 

BREATHING PATTERN.  absent: Clear to Ausculation Bilateral, Rales, Wheezes, 

Respiratory Distress





- Cardiovascular Exam


Cardiovascular Exam: +S1, +S2.  absent: Murmur





- Neurological Exam


Neurological Exam: Alert, Awake, Oriented x3





- Psychiatric Exam


Psychiatric exam: Normal Affect, Normal Mood





- Skin


Skin Exam: Dry, Intact, Normal Color, Warm





Assessment and Plan


(1) Pneumonia


Assessment & Plan: 


Continue current treatment including antibiotics; cultures of blood, urine, and 

sputum show no growth or normal nimo


CXR ordered and will follow result


Optimized for discharge; recommended outpatient follow-up


Status: Acute

## 2017-04-20 VITALS — HEART RATE: 98 BPM | RESPIRATION RATE: 20 BRPM

## 2017-04-20 LAB
ALBUMIN/GLOB SERPL: 1.1 {RATIO} (ref 1–2.1)
ALP SERPL-CCNC: 84 U/L (ref 38–126)
ALT SERPL-CCNC: 84 U/L (ref 21–72)
AST SERPL-CCNC: 26 U/L (ref 17–59)
BASOPHILS # BLD AUTO: 0.1 K/UL (ref 0–0.2)
BASOPHILS NFR BLD: 0.5 % (ref 0–2)
BILIRUB SERPL-MCNC: 0.7 MG/DL (ref 0.2–1.3)
BUN SERPL-MCNC: 18 MG/DL (ref 9–20)
CALCIUM SERPL-MCNC: 8.5 MG/DL (ref 8.6–10.4)
CHLORIDE SERPL-SCNC: 98 MMOL/L (ref 98–107)
CO2 SERPL-SCNC: 24 MMOL/L (ref 22–30)
EOSINOPHIL # BLD AUTO: 0.5 K/UL (ref 0–0.7)
EOSINOPHIL NFR BLD: 4.4 % (ref 0–4)
ERYTHROCYTE [DISTWIDTH] IN BLOOD BY AUTOMATED COUNT: 16.9 % (ref 11.5–14.5)
GLOBULIN SER-MCNC: 3.2 GM/DL (ref 2.2–3.9)
GLUCOSE SERPL-MCNC: 272 MG/DL (ref 75–110)
HCT VFR BLD CALC: 42.3 % (ref 35–51)
LYMPHOCYTES # BLD AUTO: 2.5 K/UL (ref 1–4.3)
LYMPHOCYTES NFR BLD AUTO: 22.1 % (ref 20–40)
MAGNESIUM SERPL-MCNC: 1.9 MG/DL (ref 1.6–2.3)
MCH RBC QN AUTO: 23.5 PG (ref 27–31)
MCHC RBC AUTO-ENTMCNC: 31.5 G/DL (ref 33–37)
MCV RBC AUTO: 74.9 FL (ref 80–94)
MONOCYTES # BLD: 1.3 K/UL (ref 0–0.8)
MONOCYTES NFR BLD: 11.5 % (ref 0–10)
NRBC BLD AUTO-RTO: 0 % (ref 0–2)
PHOSPHATE SERPL-MCNC: 3.8 MG/DL (ref 2.5–4.5)
PLATELET # BLD: 257 K/UL (ref 130–400)
PMV BLD AUTO: 9.7 FL (ref 7.2–11.7)
POTASSIUM SERPL-SCNC: 4.1 MMOL/L (ref 3.6–5.2)
PROT SERPL-MCNC: 6.9 G/DL (ref 6.3–8.3)
SODIUM SERPL-SCNC: 133 MMOL/L (ref 132–148)
WBC # BLD AUTO: 11.2 K/UL (ref 4.8–10.8)

## 2017-04-20 RX ADMIN — HUMAN INSULIN SCH UNIT: 100 INJECTION, SOLUTION SUBCUTANEOUS at 08:21

## 2017-04-20 RX ADMIN — IPRATROPIUM BROMIDE AND ALBUTEROL SULFATE SCH ML: .5; 3 SOLUTION RESPIRATORY (INHALATION) at 11:12

## 2017-04-20 RX ADMIN — INSULIN GLARGINE SCH UNITS: 100 INJECTION, SOLUTION SUBCUTANEOUS at 18:20

## 2017-04-20 RX ADMIN — HUMAN INSULIN SCH UNIT: 100 INJECTION, SOLUTION SUBCUTANEOUS at 18:23

## 2017-04-20 RX ADMIN — Medication SCH MG: at 18:20

## 2017-04-20 RX ADMIN — INSULIN GLARGINE SCH UNITS: 100 INJECTION, SOLUTION SUBCUTANEOUS at 07:09

## 2017-04-20 RX ADMIN — IPRATROPIUM BROMIDE AND ALBUTEROL SULFATE SCH ML: .5; 3 SOLUTION RESPIRATORY (INHALATION) at 16:19

## 2017-04-20 RX ADMIN — IPRATROPIUM BROMIDE AND ALBUTEROL SULFATE SCH: .5; 3 SOLUTION RESPIRATORY (INHALATION) at 03:15

## 2017-04-20 RX ADMIN — INSULIN ASPART SCH UNIT: 100 INJECTION, SOLUTION INTRAVENOUS; SUBCUTANEOUS at 18:20

## 2017-04-20 RX ADMIN — INSULIN ASPART SCH UNIT: 100 INJECTION, SOLUTION INTRAVENOUS; SUBCUTANEOUS at 08:20

## 2017-04-20 RX ADMIN — IPRATROPIUM BROMIDE AND ALBUTEROL SULFATE SCH ML: .5; 3 SOLUTION RESPIRATORY (INHALATION) at 20:14

## 2017-04-20 RX ADMIN — IPRATROPIUM BROMIDE AND ALBUTEROL SULFATE SCH ML: .5; 3 SOLUTION RESPIRATORY (INHALATION) at 07:34

## 2017-04-20 RX ADMIN — HUMAN INSULIN SCH UNIT: 100 INJECTION, SOLUTION SUBCUTANEOUS at 22:30

## 2017-04-20 RX ADMIN — Medication SCH MG: at 09:48

## 2017-04-20 RX ADMIN — INSULIN ASPART SCH UNIT: 100 INJECTION, SOLUTION INTRAVENOUS; SUBCUTANEOUS at 12:13

## 2017-04-20 RX ADMIN — POLYVINYL ALCOHOL PRN DROP: 14 SOLUTION/ DROPS OPHTHALMIC at 08:27

## 2017-04-20 RX ADMIN — HUMAN INSULIN SCH UNIT: 100 INJECTION, SOLUTION SUBCUTANEOUS at 12:13

## 2017-04-20 RX ADMIN — PANTOPRAZOLE SODIUM SCH MG: 40 TABLET, DELAYED RELEASE ORAL at 09:48

## 2017-04-20 NOTE — CP.PCM.PN
<John Jon - Last Filed: 04/20/17 14:09>





Subjective





- Date & Time of Evaluation


Date of Evaluation: 04/20/17


Time of Evaluation: 07:14





- Subjective


Subjective: 


Pt seen and examined. Pt reports that he is breathing comfortably at rest 

today. Pt reports that he gets slightly short of breath after ambulating. Pt 

reports that he is having bowel movements. Pt denies fever, chills, chest pain, 

nausea, and vomiting.





Objective





- Vital Signs/Intake and Output


Vital Signs (last 24 hours): 


 











Temp Pulse Resp BP Pulse Ox


 


 97.8 F   61   106 H  142/87   97 


 


 04/20/17 07:00  04/20/17 09:00  04/20/17 07:00  04/20/17 09:48  04/20/17 07:00











- Medications


Medications: 


 Current Medications





Albuterol/Ipratropium (Duoneb 3 Mg/0.5 Mg (3 Ml) Ud)  3 ml INH RQ4 [facility]


   Last Admin: 04/20/17 11:12 Dose:  3 ml


Artificial Tears (Artificial Tears)  1 ml OU Q4 PRN


   PRN Reason: Dry eyes


   Last Admin: 04/20/17 08:27 Dose:  1 drop


Aspirin (Ecotrin)  81 mg PO DAILY [facility]


   Last Admin: 04/20/17 09:48 Dose:  81 mg


Docusate Sodium (Colace)  100 mg PO DAILY [facility]


   Last Admin: 04/20/17 09:48 Dose:  100 mg


Furosemide (Lasix)  20 mg IVP DAILY [facility]


   Last Admin: 04/20/17 09:48 Dose:  20 mg


Heparin Sodium (Porcine) (Heparin)  5,000 units SC Q8 [facility]


   Last Admin: 04/20/17 07:12 Dose:  5,000 units


Hydrochlorothiazide (Microzide)  12.5 mg PO DAILY [facility]


   Last Admin: 04/20/17 09:48 Dose:  12.5 mg


Vancomycin HCl 1,000 mg/ (Sodium Chloride)  250 mls @ 166.6 mls/hr IVPB Q12H [facility]


   Last Admin: 04/20/17 12:12 Dose:  166.6 mls/hr


Azithromycin 500 mg/ Sodium (Chloride)  250 mls @ 166.667 mls/hr IVPB Q24H [facility]


   Last Admin: 04/19/17 19:51 Dose:  166.667 mls/hr


Imipenem/Cilastatin Sodium 500 (mg/ Sodium Chloride)  100 mls @ 100 mls/hr IVPB 

Q6H [facility]


   Last Admin: 04/20/17 08:21 Dose:  100 mls/hr


Insulin Aspart (Novolog)  10 unit SC AC [facility]


   Last Admin: 04/20/17 12:13 Dose:  10 unit


Insulin Glargine (Lantus)  20 unit SC Q12H [facility]


   Last Admin: 04/20/17 07:09 Dose:  20 units


Insulin Human Regular (Novolin R)  0 unit SC ACHS [facility]


   PRN Reason: Protocol


   Last Admin: 04/20/17 12:13 Dose:  6 unit


Lactulose (Enulose)  20 gm PO Q4H [facility]


   Last Admin: 04/20/17 09:48 Dose:  20 gm


Losartan Potassium (Cozaar)  50 mg PO DAILY [facility]


   Last Admin: 04/20/17 09:48 Dose:  50 mg


Ondansetron HCl (Zofran Inj)  4 mg IVP Q6 PRN


   PRN Reason: Nausea/Vomiting


Pantoprazole Sodium (Protonix Ec Tab)  40 mg PO DAILY [facility]


   Last Admin: 04/20/17 09:48 Dose:  40 mg


Rosuvastatin Calcium (Crestor)  10 mg PO HS [facility]


   Last Admin: 04/19/17 22:25 Dose:  10 mg


Saccharomyces Boulardii (Florastor)  250 mg PO BID [facility]


   Last Admin: 04/20/17 09:48 Dose:  250 mg


Zolpidem Tartrate (Ambien)  5 mg PO HS PRN


   PRN Reason: Insomnia


   Last Admin: 04/19/17 22:30 Dose:  5 mg











- Labs


Labs: 


 





 04/20/17 08:33 





 04/20/17 08:33 





 











PT  14.2 SECONDS (9.7-12.2)  H  04/17/17  07:08    


 


INR  1.3   04/17/17  07:08    


 


APTT  28 SECONDS (21-34)   04/17/17  07:08    














- Constitutional


Appears: No Acute Distress





- Head Exam


Head Exam: ATRAUMATIC, NORMOCEPHALIC





- Eye Exam


Eye Exam: EOMI, PERRL





- ENT Exam


ENT Exam: Mucous Membranes Moist.  absent: Mucous Membranes Dry





- Neck Exam


Neck Exam: Full ROM.  absent: Lymphadenopathy





- Respiratory Exam


Respiratory Exam: Decreased Breath Sounds.  absent: Rales, Rhonchi


Additional comments: 


Decreased breath sounds on the right side





- Cardiovascular Exam


Cardiovascular Exam: Gallop, +S1, +S2





- GI/Abdominal Exam


GI & Abdominal Exam: Soft.  absent: Distended, Tenderness





- Extremities Exam


Extremities Exam: Full ROM.  absent: Pedal Edema





- Neurological Exam


Neurological Exam: Alert, Awake, Oriented x3





- Psychiatric Exam


Psychiatric exam: Normal Affect, Normal Mood





- Skin


Skin Exam: Normal Color, Warm





Assessment and Plan





- Assessment and Plan (Free Text)


Assessment: 


 


Sepsis:


WBC 11.2, much improved


Afebrile, nontachycardic


Lactate 2.0


Initial procalcitonin 14.7


Repeat procalcitonin 2.09, improving


Sputum cultures normal


Blood cultures no growth after 4 days


Urine no cultures no growth


Primaxin 250 mg IV q6h


Vancomycin 1 gm IV q12h


Azithromycin 500 mg IV qd





Pneumonia:


Initial CXR - consolidation involving Right lauren diaphragm (inferior RUL, RML, 

and RLL) and small right pleural effusion (see full report)


Repeat CXR - biapical pleural thickening, prominent right air space 

consolidative changes inthe right mid to lower lung zone with associated small 

right pleural effusion. Diffuse increased interstitial lung markings (please 

see full report)


WBC 11.2, much improved


Lactic Acid 2.0


Initial Procalcitonin 14.7


Repeat procalcitonin 2.09, improving


Primaxin 500 mg IV q6h


Vancomycin 1 gm IV q12h


Azithromycin 500 mg IV qd


Infectious Disease, Dr. Finley, consulted. Help appreciated.


Chest/Abd/Pelvis CT w/o contrast - extensive patchy infiltrates throughout the 

right hemithorax; bibasilar ateletasis, infilrates; indeterminate right 

axillary mass; no abdominal or pelvic ascites; enlarged prostate gland (please 

see full report)


Pulmonology, Dr. Henry, consulted. Help appreciated. 





Elevated D-Dimer:


D-dimer - 862


V/Q scan - low probability for PE (please see full report)


B/L lower extremity venous dopplers- no evidence of DVTs bilaterally (please 

see full report)





Peripheral Vascular Disease:


Vascular Surgery, Dr. Kaiser, consulted. Help appreciated.


Angiography ileofemoral runoff - distal SFA patent patient with mild instent; 

remarkable common femoral artery and posterior tibial artery; possible areas of 

stenosis within the proximal peroneal artery (please see full report)





CHF:


Cardiology, Dr. Escobedo, consulted. Help appreciated.


Echo pending


Troponins x 30.0220, 0.0600, 0.0390


Pro-BNP 48


Lasix 20 mg po qd





COPD:


Duonebs prn





HTN:


Home medications held due to sepsis; maintain blood pressure





Diabetes Mellitus:


Insulin sliding scale


Accuchecks





Coronary artery disease:


ASA 81 mg PO daily


Crestor 10 mg PO HS





Hyperlipidemia:


Crestor 10 mg PO HS





Prophylactic Measures:


DVT: Heparin 5,000 units SC Q8H, SCDs contraindicated due to lower extremity 

pain 


GI: Protonix 40 mg PO daily








<Jorge Tripp H - Last Filed: 04/20/17 15:27>





Objective





- Vital Signs/Intake and Output


Vital Signs (last 24 hours): 


 











Temp Pulse Resp BP Pulse Ox


 


 97.8 F   61   106 H  142/87   97 


 


 04/20/17 07:00  04/20/17 09:00  04/20/17 07:00  04/20/17 09:48  04/20/17 07:00











- Medications


Medications: 


 Current Medications





Albuterol/Ipratropium (Duoneb 3 Mg/0.5 Mg (3 Ml) Ud)  3 ml INH RQ4 [facility]


   Last Admin: 04/20/17 11:12 Dose:  3 ml


Artificial Tears (Artificial Tears)  1 ml OU Q4 PRN


   PRN Reason: Dry eyes


   Last Admin: 04/20/17 08:27 Dose:  1 drop


Aspirin (Ecotrin)  81 mg PO DAILY [facility]


   Last Admin: 04/20/17 09:48 Dose:  81 mg


Docusate Sodium (Colace)  100 mg PO DAILY [facility]


   Last Admin: 04/20/17 09:48 Dose:  100 mg


Furosemide (Lasix)  20 mg IVP DAILY [facility]


   Last Admin: 04/20/17 09:48 Dose:  20 mg


Heparin Sodium (Porcine) (Heparin)  5,000 units SC Q8 [facility]


   Last Admin: 04/20/17 14:17 Dose:  5,000 units


Hydrochlorothiazide (Microzide)  12.5 mg PO DAILY [facility]


   Last Admin: 04/20/17 09:48 Dose:  12.5 mg


Vancomycin HCl 1,000 mg/ (Sodium Chloride)  250 mls @ 166.6 mls/hr IVPB Q12H [facility]


   Last Admin: 04/20/17 12:12 Dose:  166.6 mls/hr


Azithromycin 500 mg/ Sodium (Chloride)  250 mls @ 166.667 mls/hr IVPB Q24H [facility]


   Last Admin: 04/19/17 19:51 Dose:  166.667 mls/hr


Imipenem/Cilastatin Sodium 500 (mg/ Sodium Chloride)  100 mls @ 100 mls/hr IVPB 

Q6H [facility]


   Last Admin: 04/20/17 14:22 Dose:  100 mls/hr


Insulin Aspart (Novolog)  10 unit SC AC [facility]


   Last Admin: 04/20/17 12:13 Dose:  10 unit


Insulin Glargine (Lantus)  20 unit SC Q12H [facility]


   Last Admin: 04/20/17 07:09 Dose:  20 units


Insulin Human Regular (Novolin R)  0 unit SC ACHS [facility]


   PRN Reason: Protocol


   Last Admin: 04/20/17 12:13 Dose:  6 unit


Lactulose (Enulose)  20 gm PO Q4H [facility]


   Last Admin: 04/20/17 14:21 Dose:  20 gm


Losartan Potassium (Cozaar)  50 mg PO DAILY [facility]


   Last Admin: 04/20/17 09:48 Dose:  50 mg


Ondansetron HCl (Zofran Inj)  4 mg IVP Q6 PRN


   PRN Reason: Nausea/Vomiting


Pantoprazole Sodium (Protonix Ec Tab)  40 mg PO DAILY [facility]


   Last Admin: 04/20/17 09:48 Dose:  40 mg


Rosuvastatin Calcium (Crestor)  10 mg PO HS [facility]


   Last Admin: 04/19/17 22:25 Dose:  10 mg


Saccharomyces Boulardii (Florastor)  250 mg PO BID [facility]


   Last Admin: 04/20/17 09:48 Dose:  250 mg


Zolpidem Tartrate (Ambien)  5 mg PO HS PRN


   PRN Reason: Insomnia


   Last Admin: 04/19/17 22:30 Dose:  5 mg











- Labs


Labs: 


 





 04/20/17 08:33 





 04/20/17 08:33 





 











PT  14.2 SECONDS (9.7-12.2)  H  04/17/17  07:08    


 


INR  1.3   04/17/17  07:08    


 


APTT  28 SECONDS (21-34)   04/17/17  07:08    














Attending/Attestation





- Attestation


I have personally seen and examined this patient.: Yes


I have fully participated in the care of the patient.: Yes


I have reviewed all pertinent clinical information, including history, physical 

exam and plan: Yes


Notes (Text): 


Medical Attending: Patient was seen and examined by me. Agree with the above 

note by the resident


The patient was able to ambulate in the room, he states that his breathing is 

well he is not short of breath when he tries to walk. He also denied having 

chest pain when he walks as well.





The patient has had a rather remarkable turnaround in his labs. His white blood 

cell count is finally come down to 11, bandemia count is also decreased as well

, the pro-calcitonin count is also decreased as well. This being said we were 

not able to grow any blood culture so far they've remain negative for the past 

4 days now the other cultures are also negative as well. So while I am glad 

that the his numbers are better and he is feeling far better and looking better

, it would've been nice to know what, bacteria we were treating this being said 

he's currently on vancomycin and primaxin





Hopefully will discharge patient tomorrow





Thank you very much,


Jorge Tripp

## 2017-04-20 NOTE — CP.PCM.PN
<Gennaro Mar - Last Filed: 04/20/17 10:05>





Subjective





- Date & Time of Evaluation


Date of Evaluation: 04/20/17


Time of Evaluation: 10:05





- Subjective


Subjective: 


Cardiology Progress Note


Dr. Escobedo








Patient seen and examined at the bedside.  No acute distress.  No acute events 

overnight.  Nursing staff reports no issues.  The patient is resting 

comfortably in bed.  The patient denies all cardiopulmonary complaints.  12 

point review of systems was preformed and the patient denied all complaints at 

this time.





Objective





- Vital Signs/Intake and Output


Vital Signs (last 24 hours): 


 











Temp Pulse Resp BP Pulse Ox


 


 97.8 F   61   106 H  142/87   97 


 


 04/20/17 07:00  04/20/17 07:00  04/20/17 07:00  04/20/17 09:48  04/20/17 07:00











- Medications


Medications: 


 Current Medications





Albuterol/Ipratropium (Duoneb 3 Mg/0.5 Mg (3 Ml) Ud)  3 ml INH RQ4 [facility]


   Last Admin: 04/20/17 07:34 Dose:  3 ml


Artificial Tears (Artificial Tears)  1 ml OU Q4 PRN


   PRN Reason: Dry eyes


   Last Admin: 04/20/17 08:27 Dose:  1 drop


Aspirin (Ecotrin)  81 mg PO DAILY [facility]


   Last Admin: 04/20/17 09:48 Dose:  81 mg


Docusate Sodium (Colace)  100 mg PO DAILY [facility]


   Last Admin: 04/20/17 09:48 Dose:  100 mg


Furosemide (Lasix)  20 mg IVP DAILY [facility]


   Last Admin: 04/20/17 09:48 Dose:  20 mg


Heparin Sodium (Porcine) (Heparin)  5,000 units SC Q8 [facility]


   Last Admin: 04/20/17 07:12 Dose:  5,000 units


Hydrochlorothiazide (Microzide)  12.5 mg PO DAILY [facility]


   Last Admin: 04/20/17 09:48 Dose:  12.5 mg


Vancomycin HCl 1,000 mg/ (Sodium Chloride)  250 mls @ 166.6 mls/hr IVPB Q12H [facility]


   Last Admin: 04/19/17 23:36 Dose:  166.6 mls/hr


Azithromycin 500 mg/ Sodium (Chloride)  250 mls @ 166.667 mls/hr IVPB Q24H [facility]


   Last Admin: 04/19/17 19:51 Dose:  166.667 mls/hr


Imipenem/Cilastatin Sodium 500 (mg/ Sodium Chloride)  100 mls @ 100 mls/hr IVPB 

Q6H [facility]


   Last Admin: 04/20/17 08:21 Dose:  100 mls/hr


Insulin Aspart (Novolog)  10 unit SC AC [facility]


   Last Admin: 04/20/17 08:20 Dose:  10 unit


Insulin Glargine (Lantus)  20 unit SC Q12H [facility]


   Last Admin: 04/20/17 07:09 Dose:  20 units


Insulin Human Regular (Novolin R)  0 unit SC ACHS [facility]


   PRN Reason: Protocol


   Last Admin: 04/20/17 08:21 Dose:  4 unit


Lactulose (Enulose)  20 gm PO Q4H [facility]


   Last Admin: 04/20/17 09:48 Dose:  20 gm


Losartan Potassium (Cozaar)  50 mg PO DAILY [facility]


   Last Admin: 04/20/17 09:48 Dose:  50 mg


Ondansetron HCl (Zofran Inj)  4 mg IVP Q6 PRN


   PRN Reason: Nausea/Vomiting


Pantoprazole Sodium (Protonix Ec Tab)  40 mg PO DAILY [facility]


   Last Admin: 04/20/17 09:48 Dose:  40 mg


Rosuvastatin Calcium (Crestor)  10 mg PO HS [facility]


   Last Admin: 04/19/17 22:25 Dose:  10 mg


Saccharomyces Boulardii (Florastor)  250 mg PO BID [facility]


   Last Admin: 04/20/17 09:48 Dose:  250 mg


Zolpidem Tartrate (Ambien)  5 mg PO HS PRN


   PRN Reason: Insomnia


   Last Admin: 04/19/17 22:30 Dose:  5 mg











- Labs


Labs: 


 





 04/20/17 08:33 





 04/20/17 08:33 





 











PT  14.2 SECONDS (9.7-12.2)  H  04/17/17  07:08    


 


INR  1.3   04/17/17  07:08    


 


APTT  28 SECONDS (21-34)   04/17/17  07:08    














- Additional Findings


Additional findings: 


- Constitutional


Appears: No Acute Distress





- Head Exam


Head Exam: ATRAUMATIC, NORMAL INSPECTION, NORMOCEPHALIC





- Eye Exam


Eye Exam: EOMI, Normal appearance





- ENT Exam


ENT Exam: Mucous Membranes Moist





- Neck Exam


Neck exam: Positive for: Full Rom, Normal Inspection.  Negative for: 

Lymphadenopathy





- Respiratory Exam


Respiratory Exam: Decreased Breath Sounds (Right), Rhonchi (Right middle and 

lower), NORMAL BREATHING PATTERN.  absent: Accessory Muscle Use, Clear to 

Auscultation Bilateral, Wheezes, Respiratory Distress





- Cardiovascular Exam


Cardiovascular Exam: REGULAR RHYTHM, RRR, +S1, +S2.  absent: Diastolic murmur, 

Systolic Murmur





- GI/Abdominal Exam


GI & Abdominal Exam: Distended, Normal Bowel Sounds, Soft.  absent: Bruit, 

Guarding, Rebound, Rigid, Tenderness





- Extremities Exam


Extremities exam: Positive for: calf tenderness (B/L), normal inspection, pedal 

edema (1+ B/L), tenderness, pedal pulses present.  Negative for: joint swelling





- Neurological Exam


Neurological exam: Alert, CN II-XII Intact, Oriented x3, Normal Gait (patient 

ambulates with walker, gait is non-antalgic and without hesitancy) 





- Skin


Skin Exam: Dry, Intact, Normal Color, Warm





Assessment and Plan


(1) Diastolic CHF


Assessment & Plan: 


Continue Lasix 20mg IV daily


Gt hose stockings


Cardiac Stable 





4/17/17 Echo- LV EF- 71%, diastolic dysfunction ,dilated LA, no valvular 

abnormalities


Continue:


   - aspirin 81mg po daily


   - Crestor 10mg po hs


4/16/17 EKG- Sinus Tachycardia, Normal SD, widened QRS and prolonged QTc, Right 

axis, Right bundle branch block


8/17/16 Echo- LV EF 75%


9/19/15 Echo- LV EF 80% diastolic dysfunction








Case discussed with Dr. Fanny Mar PGY1


Status: h   





<Kojo Escobedo - Last Filed: 05/29/17 18:03>





Objective





- Vital Signs/Intake and Output


Vital Signs (last 24 hours): 


 











Temp Pulse Resp BP Pulse Ox


 


 97.8 F   98 H  20   133/70   94 L


 


 04/21/17 07:10  04/21/17 07:10  04/21/17 07:10  04/21/17 09:36  04/23/17 09:19











- Labs


Labs: 


 





 04/21/17 07:17 





 04/21/17 07:17 





 











PT  14.2 SECONDS (9.7-12.2)  H  04/17/17  07:08    


 


INR  1.3   04/17/17  07:08    


 


APTT  28 SECONDS (21-34)   04/17/17  07:08    














Attending/Attestation





- Attestation


I have personally seen and examined this patient.: Yes


I have fully participated in the care of the patient.: Yes


I have reviewed all pertinent clinical information, including history, physical 

exam and plan: Yes


Notes (Text): 





05/29/17 18:03


nl ef on echo continue lasix

## 2017-04-20 NOTE — CP.PCM.PN
Subjective





- Date & Time of Evaluation


Date of Evaluation: 04/19/17


Time of Evaluation: 07:00





- Subjective


Subjective: 


Pt S&E. Ambulating with 4 pt walker. Reports having BM. States leg pain has 

slightly improved. NAEO.





Objective





- Vital Signs/Intake and Output


Vital Signs (last 24 hours): 


 











Temp Pulse Resp BP Pulse Ox


 


 98.0 F   100 H  20   140/78   94 L


 


 04/19/17 15:06  04/19/17 23:52  04/19/17 15:06  04/19/17 15:06  04/19/17 15:06











- Medications


Medications: 


 Current Medications





Albuterol/Ipratropium (Duoneb 3 Mg/0.5 Mg (3 Ml) Ud)  3 ml INH RQ4 [facility]


   Last Admin: 04/19/17 23:51 Dose:  Not Given


Artificial Tears (Artificial Tears)  1 ml OU Q4 PRN


   PRN Reason: Dry eyes


   Last Admin: 04/19/17 09:06 Dose:  2 drop


Aspirin (Ecotrin)  81 mg PO DAILY [facility]


   Last Admin: 04/19/17 09:08 Dose:  81 mg


Docusate Sodium (Colace)  100 mg PO DAILY [facility]


   Last Admin: 04/19/17 09:08 Dose:  100 mg


Furosemide (Lasix)  20 mg IVP DAILY [facility]


   Last Admin: 04/19/17 09:06 Dose:  20 mg


Heparin Sodium (Porcine) (Heparin)  5,000 units SC Q8 [facility]


   Last Admin: 04/19/17 22:25 Dose:  5,000 units


Hydrochlorothiazide (Microzide)  12.5 mg PO DAILY [facility]


   Last Admin: 04/19/17 09:07 Dose:  12.5 mg


Vancomycin HCl 1,000 mg/ (Sodium Chloride)  250 mls @ 166.6 mls/hr IVPB Q12H [facility]


   Last Admin: 04/19/17 23:36 Dose:  166.6 mls/hr


Azithromycin 500 mg/ Sodium (Chloride)  250 mls @ 166.667 mls/hr IVPB Q24H [facility]


   Last Admin: 04/19/17 19:51 Dose:  166.667 mls/hr


Imipenem/Cilastatin Sodium 500 (mg/ Sodium Chloride)  100 mls @ 100 mls/hr IVPB 

Q6H [facility]


   Last Admin: 04/19/17 22:25 Dose:  100 mls/hr


Insulin Aspart (Novolog)  10 unit SC AC [facility]


   Last Admin: 04/19/17 17:39 Dose:  10 unit


Insulin Glargine (Lantus)  20 unit SC Q12H [facility]


   Last Admin: 04/19/17 19:51 Dose:  20 units


Insulin Human Regular (Novolin R)  0 unit SC ACHS [facility]


   PRN Reason: Protocol


   Last Admin: 04/19/17 22:26 Dose:  Not Given


Lactulose (Enulose)  20 gm PO Q4H [facility]


   Last Admin: 04/19/17 22:31 Dose:  Not Given


Losartan Potassium (Cozaar)  50 mg PO DAILY [facility]


   Last Admin: 04/19/17 09:07 Dose:  50 mg


Ondansetron HCl (Zofran Inj)  4 mg IVP Q6 PRN


   PRN Reason: Nausea/Vomiting


Pantoprazole Sodium (Protonix Ec Tab)  40 mg PO DAILY [facility]


   Last Admin: 04/19/17 09:07 Dose:  40 mg


Rosuvastatin Calcium (Crestor)  10 mg PO HS [facility]


   Last Admin: 04/19/17 22:25 Dose:  10 mg


Saccharomyces Boulardii (Florastor)  250 mg PO BID [facility]


   Last Admin: 04/19/17 17:39 Dose:  250 mg


Zolpidem Tartrate (Ambien)  5 mg PO HS PRN


   PRN Reason: Insomnia


   Last Admin: 04/19/17 22:30 Dose:  5 mg











- Labs


Labs: 


 





 04/19/17 07:20 





 04/19/17 07:20 





 











PT  14.2 SECONDS (9.7-12.2)  H  04/17/17  07:08    


 


INR  1.3   04/17/17  07:08    


 


APTT  28 SECONDS (21-34)   04/17/17  07:08    














- Constitutional


Appears: No Acute Distress





- ENT Exam


ENT Exam: Mucous Membranes Moist





- Respiratory Exam


Respiratory Exam: NORMAL BREATHING PATTERN





- Cardiovascular Exam


Cardiovascular Exam: +S1, +S2





- GI/Abdominal Exam


GI & Abdominal Exam: Soft.  absent: Tenderness





- Neurological Exam


Neurological Exam: Alert, Awake, Oriented x3





- Psychiatric Exam


Psychiatric exam: Normal Mood





- Skin


Skin Exam: Dry, Warm





Assessment and Plan





- Assessment and Plan (Free Text)


Assessment: 


66M w/ claudication


-Angiography showed sites of stenosis


-No emergent surgical intervention required at this time


-Patient can follow up as outpatient for further evaluation


-Rodriguez Fernandez

## 2017-04-20 NOTE — CP.PCM.PN
Subjective





- Date & Time of Evaluation


Date of Evaluation: 04/20/17


Time of Evaluation: 01:45





- Subjective


Subjective: 


dictated





Objective





- Vital Signs/Intake and Output


Vital Signs (last 24 hours): 


 











Temp Pulse Resp BP Pulse Ox


 


 98.0 F   98 H  20   132/77   94 L


 


 04/20/17 15:50  04/20/17 15:50  04/20/17 15:50  04/20/17 15:50  04/20/17 15:50











- Medications


Medications: 


 Current Medications





Albuterol/Ipratropium (Duoneb 3 Mg/0.5 Mg (3 Ml) Ud)  3 ml INH RQ4 [facility]


   Last Admin: 04/20/17 16:19 Dose:  3 ml


Artificial Tears (Artificial Tears)  1 ml OU Q4 PRN


   PRN Reason: Dry eyes


   Last Admin: 04/20/17 08:27 Dose:  1 drop


Aspirin (Ecotrin)  81 mg PO DAILY [facility]


   Last Admin: 04/20/17 09:48 Dose:  81 mg


Docusate Sodium (Colace)  100 mg PO DAILY [facility]


   Last Admin: 04/20/17 09:48 Dose:  100 mg


Furosemide (Lasix)  20 mg IVP DAILY [facility]


   Last Admin: 04/20/17 09:48 Dose:  20 mg


Heparin Sodium (Porcine) (Heparin)  5,000 units SC Q8 [facility]


   Last Admin: 04/20/17 14:17 Dose:  5,000 units


Hydrochlorothiazide (Microzide)  12.5 mg PO DAILY [facility]


   Last Admin: 04/20/17 09:48 Dose:  12.5 mg


Vancomycin HCl 1,000 mg/ (Sodium Chloride)  250 mls @ 166.6 mls/hr IVPB Q12H [facility]


   Last Admin: 04/20/17 12:12 Dose:  166.6 mls/hr


Azithromycin 500 mg/ Sodium (Chloride)  250 mls @ 166.667 mls/hr IVPB Q24H [facility]


   Last Admin: 04/19/17 19:51 Dose:  166.667 mls/hr


Imipenem/Cilastatin Sodium 500 (mg/ Sodium Chloride)  100 mls @ 100 mls/hr IVPB 

Q6H [facility]


   Last Admin: 04/20/17 14:22 Dose:  100 mls/hr


Insulin Aspart (Novolog)  10 unit SC AC [facility]


   Last Admin: 04/20/17 12:13 Dose:  10 unit


Insulin Glargine (Lantus)  20 unit SC Q12H [facility]


   Last Admin: 04/20/17 07:09 Dose:  20 units


Insulin Human Regular (Novolin R)  0 unit SC ACHS [facility]


   PRN Reason: Protocol


   Last Admin: 04/20/17 12:13 Dose:  6 unit


Lactulose (Enulose)  20 gm PO Q4H [facility]


   Last Admin: 04/20/17 14:21 Dose:  20 gm


Losartan Potassium (Cozaar)  50 mg PO DAILY [facility]


   Last Admin: 04/20/17 09:48 Dose:  50 mg


Ondansetron HCl (Zofran Inj)  4 mg IVP Q6 PRN


   PRN Reason: Nausea/Vomiting


Pantoprazole Sodium (Protonix Ec Tab)  40 mg PO DAILY [facility]


   Last Admin: 04/20/17 09:48 Dose:  40 mg


Rosuvastatin Calcium (Crestor)  10 mg PO HS [facility]


   Last Admin: 04/19/17 22:25 Dose:  10 mg


Saccharomyces Boulardii (Florastor)  250 mg PO BID [facility]


   Last Admin: 04/20/17 09:48 Dose:  250 mg


Zolpidem Tartrate (Ambien)  5 mg PO HS PRN


   PRN Reason: Insomnia


   Last Admin: 04/19/17 22:30 Dose:  5 mg











- Labs


Labs: 


 





 04/20/17 08:33 





 04/20/17 08:33 





 











PT  14.2 SECONDS (9.7-12.2)  H  04/17/17  07:08    


 


INR  1.3   04/17/17  07:08    


 


APTT  28 SECONDS (21-34)   04/17/17  07:08

## 2017-04-21 VITALS — TEMPERATURE: 97.8 F

## 2017-04-21 VITALS — SYSTOLIC BLOOD PRESSURE: 133 MMHG | DIASTOLIC BLOOD PRESSURE: 70 MMHG

## 2017-04-21 LAB
ALBUMIN/GLOB SERPL: 1.1 {RATIO} (ref 1–2.1)
ALP SERPL-CCNC: 76 U/L (ref 38–126)
ALT SERPL-CCNC: 69 U/L (ref 21–72)
AST SERPL-CCNC: 19 U/L (ref 17–59)
BASOPHILS # BLD AUTO: 0.1 K/UL (ref 0–0.2)
BASOPHILS NFR BLD: 0.5 % (ref 0–2)
BILIRUB SERPL-MCNC: 0.8 MG/DL (ref 0.2–1.3)
BUN SERPL-MCNC: 19 MG/DL (ref 9–20)
CALCIUM SERPL-MCNC: 8.3 MG/DL (ref 8.6–10.4)
CHLORIDE SERPL-SCNC: 98 MMOL/L (ref 98–107)
CO2 SERPL-SCNC: 24 MMOL/L (ref 22–30)
EOSINOPHIL # BLD AUTO: 1.7 K/UL (ref 0–0.7)
EOSINOPHIL NFR BLD: 14.6 % (ref 0–4)
ERYTHROCYTE [DISTWIDTH] IN BLOOD BY AUTOMATED COUNT: 16.5 % (ref 11.5–14.5)
GLOBULIN SER-MCNC: 3.1 GM/DL (ref 2.2–3.9)
GLUCOSE SERPL-MCNC: 371 MG/DL (ref 75–110)
HCT VFR BLD CALC: 39.5 % (ref 35–51)
LYMPHOCYTES # BLD AUTO: 2.2 K/UL (ref 1–4.3)
LYMPHOCYTES NFR BLD AUTO: 18.9 % (ref 20–40)
MAGNESIUM SERPL-MCNC: 1.9 MG/DL (ref 1.6–2.3)
MCH RBC QN AUTO: 23.6 PG (ref 27–31)
MCHC RBC AUTO-ENTMCNC: 31.4 G/DL (ref 33–37)
MCV RBC AUTO: 75.1 FL (ref 80–94)
MONOCYTES # BLD: 1 K/UL (ref 0–0.8)
MONOCYTES NFR BLD: 8.7 % (ref 0–10)
NRBC BLD AUTO-RTO: 0 % (ref 0–2)
PHOSPHATE SERPL-MCNC: 3.7 MG/DL (ref 2.5–4.5)
PLATELET # BLD: 267 K/UL (ref 130–400)
PMV BLD AUTO: 9.5 FL (ref 7.2–11.7)
POTASSIUM SERPL-SCNC: 4.6 MMOL/L (ref 3.6–5.2)
PROT SERPL-MCNC: 6.4 G/DL (ref 6.3–8.3)
SODIUM SERPL-SCNC: 131 MMOL/L (ref 132–148)
WBC # BLD AUTO: 11.6 K/UL (ref 4.8–10.8)

## 2017-04-21 RX ADMIN — IPRATROPIUM BROMIDE AND ALBUTEROL SULFATE SCH: .5; 3 SOLUTION RESPIRATORY (INHALATION) at 01:00

## 2017-04-21 RX ADMIN — IPRATROPIUM BROMIDE AND ALBUTEROL SULFATE SCH: .5; 3 SOLUTION RESPIRATORY (INHALATION) at 03:05

## 2017-04-21 RX ADMIN — INSULIN ASPART SCH UNIT: 100 INJECTION, SOLUTION INTRAVENOUS; SUBCUTANEOUS at 12:21

## 2017-04-21 RX ADMIN — HUMAN INSULIN SCH UNIT: 100 INJECTION, SOLUTION SUBCUTANEOUS at 08:37

## 2017-04-21 RX ADMIN — INSULIN GLARGINE SCH UNITS: 100 INJECTION, SOLUTION SUBCUTANEOUS at 06:45

## 2017-04-21 RX ADMIN — PANTOPRAZOLE SODIUM SCH MG: 40 TABLET, DELAYED RELEASE ORAL at 09:36

## 2017-04-21 RX ADMIN — IPRATROPIUM BROMIDE AND ALBUTEROL SULFATE SCH ML: .5; 3 SOLUTION RESPIRATORY (INHALATION) at 11:13

## 2017-04-21 RX ADMIN — INSULIN ASPART SCH UNIT: 100 INJECTION, SOLUTION INTRAVENOUS; SUBCUTANEOUS at 08:37

## 2017-04-21 RX ADMIN — HUMAN INSULIN SCH UNIT: 100 INJECTION, SOLUTION SUBCUTANEOUS at 12:23

## 2017-04-21 RX ADMIN — POLYVINYL ALCOHOL PRN DROP: 14 SOLUTION/ DROPS OPHTHALMIC at 09:36

## 2017-04-21 RX ADMIN — Medication SCH MG: at 09:36

## 2017-04-21 RX ADMIN — IPRATROPIUM BROMIDE AND ALBUTEROL SULFATE SCH ML: .5; 3 SOLUTION RESPIRATORY (INHALATION) at 07:58

## 2017-04-21 NOTE — PN
DATE: 04/21/2017



SUBJECTIVE:  The patient is feeling better.  He is still tachycardic, however.



PHYSICAL EXAMINATION:

VITAL SIGNS:  T-max is 97.8, heart rate is 98, blood pressure 133/72, respirations are 20.  He denies
 any cough.

HEENT:  Head is atraumatic, normocephalic.

NECK:  Supple.

LUNGS:  Clear.  No crackles or rales present.  Decreased breath sounds bilaterally.

HEART:  S1, S2 regular.

ABDOMEN:  Soft, nontender, no guarding, no rigidity present.

EXTREMITIES:  No edema, clubbing or cyanosis.



MEDICATIONS:  He is on vancomycin, imipenem and Zithromax.



LABORATORY DATA:  Labs show legionella and mycoplasma came out negative.  Blood cultures x 2 is negat
alejandro.  Sputum is negative.  Urine culture is negative.  He had a chest CT, which showed biapical pleur
al thickening, shows bullous changes in the apices, extensive patchy infiltrates throughout the right
 hemithorax, bibasilar atelectasis, oblique infiltrate.  



He is status post surgery.  At this time, he is on 3 antibiotics.  We have mycoplasma and legionella 
negative, so will take off the Zithromax and leave him on vancomycin and Zosyn at this time and will 
follow.  He probably can go home on Monday if he is stable.  He had extensive pneumonia, so we will c
ontinue the antibiotics at this time.





__________________________________________

Patricia Finley MD







cc:



DD: 04/21/2017 13:51:47  [id number]

TT: 04/21/2017 14:07:31

Confirmation # [id number]

Dictation # [id number]

rn

## 2017-04-21 NOTE — PCM.HF
Heart Failure Core Measure





- Heart Failure


Ejection Fraction: 40 % or Greater (lvef 71%)


ACE Inhibitor Prescribed: No


Contraindication/Reason for not providing: on arb 


Beta-Blocker Prescribed: Carvedilol


Angiotensin II Receptor Blocker Prescribed: Yes


AnticoagulationTherapy for Atrial Fibrillation/Atrialflutter: No


Contraindication/Reason for not providing: no afib 


Aldosterone Antagonist Prescribed: No


Contraindication/Reason for not providing: lvef >40% 


Hydralazine Nitrate Prescribed: No


Contraindication/Reason for not providing: lvef >40% 


Implantable Cardioverter Defibrillator Therapy: No


Contraindication/Reason for not providing: lvef >40% 


Cardiac Resynchronization Therapy Prescribed: No


Contraindication/Reason for not providing: lvef >40% 





- Follow up


Will be discharged to: Home


Follow Up Date (must be within 7 days from discharge): 04/24/17


Follow Up Time: 09:00

## 2017-04-21 NOTE — CP.PCM.PN
Subjective





- Date & Time of Evaluation


Date of Evaluation: 04/21/17


Time of Evaluation: 01:00





- Subjective


Subjective: 


dictated





Objective





- Vital Signs/Intake and Output


Vital Signs (last 24 hours): 


 











Temp Pulse Resp BP Pulse Ox


 


 97.8 F   98 H  20   133/70   93 L


 


 04/21/17 07:10  04/21/17 07:10  04/21/17 07:10  04/21/17 09:36  04/21/17 07:10








Intake and Output: 


 











 04/21/17 04/21/17





 06:59 18:59


 


Output Total 600 


 


Balance -600 














- Medications


Medications: 


 Current Medications





Albuterol/Ipratropium (Duoneb 3 Mg/0.5 Mg (3 Ml) Ud)  3 ml INH RQ4 [facility]


   Last Admin: 04/21/17 11:13 Dose:  3 ml


Artificial Tears (Artificial Tears)  1 ml OU Q4 PRN


   PRN Reason: Dry eyes


   Last Admin: 04/21/17 09:36 Dose:  2 drop


Aspirin (Ecotrin)  81 mg PO DAILY [facility]


   Last Admin: 04/21/17 09:36 Dose:  81 mg


Docusate Sodium (Colace)  100 mg PO DAILY [facility]


   Last Admin: 04/21/17 09:36 Dose:  100 mg


Furosemide (Lasix)  20 mg IVP DAILY [facility]


   Last Admin: 04/21/17 09:36 Dose:  20 mg


Heparin Sodium (Porcine) (Heparin)  5,000 units SC Q8 [facility]


   Last Admin: 04/21/17 06:46 Dose:  5,000 units


Hydrochlorothiazide (Microzide)  12.5 mg PO DAILY [facility]


   Last Admin: 04/21/17 09:36 Dose:  12.5 mg


Vancomycin HCl 1,000 mg/ (Sodium Chloride)  250 mls @ 166.6 mls/hr IVPB Q12H [facility]


   Last Admin: 04/21/17 13:35 Dose:  166.6 mls/hr


Azithromycin 500 mg/ Sodium (Chloride)  250 mls @ 166.667 mls/hr IVPB Q24H [facility]


   Last Admin: 04/20/17 18:56 Dose:  166.667 mls/hr


Imipenem/Cilastatin Sodium 500 (mg/ Sodium Chloride)  100 mls @ 100 mls/hr IVPB 

Q6H [facility]


   Last Admin: 04/21/17 12:09 Dose:  100 mls/hr


Insulin Aspart (Novolog)  10 unit SC AC [facility]


   Last Admin: 04/21/17 12:21 Dose:  10 unit


Insulin Glargine (Lantus)  20 unit SC Q12H [facility]


   Last Admin: 04/21/17 06:45 Dose:  20 units


Insulin Human Regular (Novolin R)  0 unit SC ACHS [facility]


   PRN Reason: Protocol


   Last Admin: 04/21/17 12:23 Dose:  6 unit


Lactulose (Enulose)  20 gm PO Q4H [facility]


   Last Admin: 04/21/17 09:41 Dose:  Not Given


Losartan Potassium (Cozaar)  50 mg PO DAILY [facility]


   Last Admin: 04/21/17 09:36 Dose:  50 mg


Ondansetron HCl (Zofran Inj)  4 mg IVP Q6 PRN


   PRN Reason: Nausea/Vomiting


Pantoprazole Sodium (Protonix Ec Tab)  40 mg PO DAILY [facility]


   Last Admin: 04/21/17 09:36 Dose:  40 mg


Rosuvastatin Calcium (Crestor)  10 mg PO HS [facility]


   Last Admin: 04/20/17 21:25 Dose:  10 mg


Saccharomyces Boulardii (Florastor)  250 mg PO BID [facility]


   Last Admin: 04/21/17 09:36 Dose:  250 mg


Zolpidem Tartrate (Ambien)  5 mg PO HS PRN


   PRN Reason: Insomnia


   Last Admin: 04/20/17 22:29 Dose:  5 mg











- Labs


Labs: 


 





 04/21/17 07:17 





 04/21/17 07:17 





 











PT  14.2 SECONDS (9.7-12.2)  H  04/17/17  07:08    


 


INR  1.3   04/17/17  07:08    


 


APTT  28 SECONDS (21-34)   04/17/17  07:08

## 2017-04-21 NOTE — CP.PCM.DIS
<John Jon - Last Filed: 04/23/17 01:39>





Provider





- Provider


Date of Admission: 


04/16/17 11:47





Attending physician: 


Jude Pate MD





Time Spent in preparation of Discharge (in minutes): 37





Hospital Course





- Lab Results


Lab Results: 


 Micro Results





04/17/17 05:41   Sputum   Gram Stain - Final


04/17/17 05:41   Sputum   Sputum Culture - Final


                            NORMAL ORAL SASKIA


04/17/17 06:08   Urine,Clean Catch   Urine Culture - Final


                            No Growth (<1,000 CFU/ML)





 Most Recent Lab Values











WBC  11.6 K/uL (4.8-10.8)  H  04/21/17  07:17    


 


RBC  5.26 Mil/uL (4.40-5.90)   04/21/17  07:17    


 


Hgb  12.4 g/dL (12.0-18.0)   04/21/17  07:17    


 


Hct  39.5 % (35.0-51.0)   04/21/17  07:17    


 


MCV  75.1 fL (80.0-94.0)  L  04/21/17  07:17    


 


MCH  23.6 pg (27.0-31.0)  L  04/21/17  07:17    


 


MCHC  31.4 g/dL (33.0-37.0)  L  04/21/17  07:17    


 


RDW  16.5 % (11.5-14.5)  H  04/21/17  07:17    


 


Plt Count  267 K/uL (130-400)   04/21/17  07:17    


 


MPV  9.5 fL (7.2-11.7)   04/21/17  07:17    


 


Neut % (Auto)  57.3 % (50.0-75.0)   04/21/17  07:17    


 


Lymph % (Auto)  18.9 % (20.0-40.0)  L  04/21/17  07:17    


 


Mono % (Auto)  8.7 % (0.0-10.0)   04/21/17  07:17    


 


Eos % (Auto)  14.6 % (0.0-4.0)  H  04/21/17  07:17    


 


Baso % (Auto)  0.5 % (0.0-2.0)   04/21/17  07:17    


 


Neut #  6.6 K/uL (1.8-7.0)   04/21/17  07:17    


 


Lymph #  2.2 K/uL (1.0-4.3)   04/21/17  07:17    


 


Mono #  1.0 K/uL (0.0-0.8)  H  04/21/17  07:17    


 


Eos #  1.7 K/uL (0.0-0.7)  H  04/21/17  07:17    


 


Baso #  0.1 K/uL (0.0-0.2)   04/21/17  07:17    


 


Neutrophils % (Manual)  71 % (50-75)   04/18/17  06:35    


 


Band Neutrophils %  21 % (0-2)  H*  04/18/17  06:35    


 


Lymphocytes % (Manual)  3 % (20-40)  L  04/18/17  06:35    


 


Monocytes % (Manual)  4 % (0-10)   04/18/17  06:35    


 


Eosinophils % (Manual)  1 % (0-4)   04/18/17  06:35    


 


Platelet Estimate  Normal  (NORMAL)   04/18/17  06:35    


 


Large Platelets  Present   04/17/17  07:08    


 


Polychromasia  Slight   04/18/17  06:35    


 


Hypochromasia (manual)  Slight   04/18/17  06:35    


 


Poikilocytosis (manual  Slight   04/16/17  09:19    


 


Anisocytosis (manual)  Slight   04/18/17  06:35    


 


Microcytosis (manual)  Slight   04/18/17  06:35    


 


Ovalocytes  Slight   04/18/17  06:35    


 


PT  14.2 SECONDS (9.7-12.2)  H  04/17/17  07:08    


 


INR  1.3   04/17/17  07:08    


 


APTT  28 SECONDS (21-34)   04/17/17  07:08    


 


D-Dimer, Quantitative  862 ng/mlDDU (0-243)  H  04/17/17  01:30    


 


Puncture Site  Lr   04/18/17  10:08    


 


pCO2  34 mm/Hg (35-45)  L  04/18/17  10:08    


 


pO2  51 mm/Hg (30-55)   04/18/17  22:10    


 


HCO3  24.2 mmol/L (21-28)   04/18/17  10:08    


 


ABG pH  7.44  (7.35-7.45)   04/18/17  10:08    


 


ABG Total CO2  24.1 mmol/L (22-28)   04/18/17  10:08    


 


ABG O2 Saturation  90.2 % (95-98)  L  04/18/17  10:08    


 


ABG Base Excess  -0.5 mmol/L (-2.0-3.0)   04/18/17  10:08    


 


Alphonso Test  Po   04/18/17  10:08    


 


ABG Potassium  4.6 mmol/L (3.6-5.2)   04/18/17  10:08    


 


VBG pH  7.45  (7.32-7.43)  H  04/18/17  22:10    


 


VBG pCO2  30 mmHg (40-60)  L  04/18/17  22:10    


 


VBG HCO3  23.0 mmol/L  04/18/17  22:10    


 


VBG Total CO2  21.8 mmol/L (22-28)  L  04/18/17  22:10    


 


VBG O2 Sat (Calc)  88.2 % (40-65)  H  04/18/17  22:10    


 


VBG Base Excess  -2.1 mmol/L (0.0-2.0)  L  04/18/17  22:10    


 


VBG Potassium  4.2 mmol/L (3.6-5.2)   04/18/17  22:10    


 


A-a O2 Difference  57.0 mm/Hg  04/18/17  10:08    


 


Respiratory Index  1.1   04/18/17  10:08    


 


Sodium  137.0 mmol/l (132-148)   04/18/17  22:10    


 


Chloride  108.0 mmol/L ()  H  04/18/17  22:10    


 


Glucose  353 mg/dl ()  H  04/18/17  22:10    


 


Lactate  2.0 mmol/L (0.7-2.1)   04/18/17  22:10    


 


FiO2  21.0 %  04/18/17  10:08    


 


Sodium  131 mmol/L (132-148)  L  04/21/17  07:17    


 


Potassium  4.6 mmol/L (3.6-5.2)   04/21/17  07:17    


 


Chloride  98 mmol/L ()   04/21/17  07:17    


 


Carbon Dioxide  24 mmol/L (22-30)   04/21/17  07:17    


 


Anion Gap  14  (10-20)   04/21/17  07:17    


 


BUN  19 mg/dL (9-20)   04/21/17  07:17    


 


Creatinine  0.9 MG/DL (0.8-1.5)   04/21/17  07:17    


 


Est GFR ( Amer)  > 60   04/21/17  07:17    


 


Est GFR (Non-Af Amer)  > 60   04/21/17  07:17    


 


POC Glucose (mg/dL)  299 mg/dL ()  H  04/21/17  11:12    


 


Random Glucose  371 mg/dL ()  H  04/21/17  07:17    


 


Lactic Acid  1.8 mmol/L (0.7-2.1)   04/21/17  07:17    


 


Calcium  8.3 mg/dl (8.6-10.4)  L  04/21/17  07:17    


 


Phosphorus  3.7 mg/dL (2.5-4.5)   04/21/17  07:17    


 


Magnesium  1.9 mg/dL (1.6-2.3)   04/21/17  07:17    


 


Total Bilirubin  0.8 mg/dL (0.2-1.3)   04/21/17  07:17    


 


AST  19 U/L (17-59)   04/21/17  07:17    


 


ALT  69 U/L (21-72)   04/21/17  07:17    


 


Alkaline Phosphatase  76 U/L ()   04/21/17  07:17    


 


Total Creatine Kinase  88 U/L ()   04/16/17  22:26    


 


CK-MB (Mass)  2.37 ng/mL (0.0-3.38)   04/16/17  22:26    


 


Troponin I  0.0220 ng/mL (0.00-0.120)   04/16/17  09:19    


 


Troponin I, Quant  0.0390 ng/mL (0.00-0.120)   04/16/17  22:26    


 


NT-Pro-B Natriuret Pep  48.0 pg/mL (0-900)   04/16/17  09:19    


 


Total Protein  6.4 g/dL (6.3-8.3)   04/21/17  07:17    


 


Albumin  3.3 g/dL (3.5-5.0)  L  04/21/17  07:17    


 


Globulin  3.1 gm/dL (2.2-3.9)   04/21/17  07:17    


 


Albumin/Globulin Ratio  1.1  (1.0-2.1)   04/21/17  07:17    


 


Triglycerides  147 mg/dL (0-149)  D 04/16/17  14:29    


 


Cholesterol  141 mg/dL (0-199)   04/16/17  14:29    


 


LDL Cholesterol Direct  40 mg/dL (0-129)   04/16/17  14:29    


 


HDL Cholesterol  56 mg/dL (30-70)   04/16/17  14:29    


 


Prostate Specific Ag  3.87 ng/mL (0.00-4.0)   04/18/17  21:28    


 


Procalcitonin  2.09 NG/ML (0.19-0.49)  H  04/20/17  10:01    


 


Arterial Blood Potassium  4.6 mmol/L (3.6-5.2)   04/18/17  10:08    


 


Venous Blood Potassium  4.2 mmol/L (3.6-5.2)   04/18/17  22:10    


 


Ur L.pneumophila Ag  Negative  (NEGATIVE)   04/19/17  23:01    


 


Mycoplasma pneumon IgM  Negative  (NEGATIVE)   04/20/17  08:33    














- Hospital Course


Hospital Course: 


HPI: Pt is 65 y/o M with PMH of DM, HTN, HLD, PAD, CAD, CHF, COPD presents to 

[facility] with complaint of SOB. Patient stated that SOB started 

yesterday while at home. He stated that it was a sudden onset and progressively 

has gotten worse. Patient was sitting at home when he noticed difficulty 

breathing, chest discomfort, and cough. He also had associated bilateral leg 

pain. Everything besides the shortness of breath soon resolved after taking 

Aspirin. Patient reported that he had similar symptoms previously when he was 

admitted last year for CHF exacerbation. Patient currently denying any pain. 

Nothing alleviates SOB while exertion exacerbates it. Patient sleeps with 2 

pillows at night. ECHO was last done in 08/2016. Patient walks short distances 

before getting exhausted. Admits to chills, fatigue, cough, palpitations, 

pleuritic pain, nausea, and constipation. Denies fever, sputum production, 

diaphoresis, cp, numbness/tingling, vomiting, diarrhea, incontinence, and 

urinary complaints.





Hospital Course: Pt initially presented with no leukocytosis, but the second 

day of admission pt was found to have a WBC count of 21.3. Pt was tachycardic 

at 114 bpm. Band neutrophils were 34 and lactic acid was 2.2. Initial 

procalcitonin was 14.7. Initial CXR revealed consolidation involving Right lauren 

diaphragm (inferior RUL, RML, and RLL) and small right pleural effusion (see 

full report). D-dimer was elevated at 862. V/Q scan revealed low probability 

for PE (please see full report). B/L lower extremity venous dopplers revealed 

no evidence of DVTs bilaterally (please see full report). Pulmonologist, Dr. Henry, was consulted. Troponins x 30.0220, 0.0600, 0.0390, and Pro-BNP was 48. 

Pt was started on primaxin, vancomycin, and azithromycin. Chest/Abd/Pelvis CT w/

o contrast revealed extensive patchy infiltrates throughout the right hemithorax

; bibasilar ateletasis, infilrates; indeterminate right axillary mass; no 

abdominal or pelvic ascites; enlarged prostate gland (please see full report). 

Infectious Disease, Dr. Finley, was consulted. Echocardiogram revealed 

diastolic dysfunction and an ejection fraction of 71%. Pt also complained of b/

l leg pain. Vascular Surgery, Dr. Kaiser, was consulted. Angiography 

ileofemoral runoff revealed distal SFA patent patient with mild instent; 

remarkable common femoral artery and posterior tibial artery; possible areas of 

stenosis within the proximal peroneal artery (please see full report). PT's 

white count improved down to 11.2 Pt remained afebrile. Physical therapy was 

requested. Lactic Acid improved. Pt was started on lasix as well. Repeat 

procalcitonin improved to 2.09. Pt improved clinically. Pt was able to ambulate 

with walker without getting short of breath.











Discharge Exam





- Head Exam


Head Exam: ATRAUMATIC, NORMOCEPHALIC





- Eye Exam


Eye Exam: EOMI, PERRL





- ENT Exam


ENT Exam: Mucous Membranes Moist.  absent: Mucous Membranes Dry





- Respiratory Exam


Respiratory Exam: Decreased Breath Sounds.  absent: Rales, Rhonchi





- Cardiovascular Exam


Cardiovascular Exam: +S1, +S2





- GI/Abdominal Exam


GI & Abdominal Exam: Soft.  absent: Guarding





- Extremities Exam


Extremities exam: full ROM





- Neurological Exam


Neurological exam: Alert, Oriented x3





- Psychiatric Exam


Psychiatric exam: Normal Affect, Normal Mood





- Skin


Skin Exam: Normal Color, Warm





Discharge Plan





- Discharge Medications


Prescriptions: 


Amoxicillin/Clavulanate [Augmentin 875 MG-125 MG] 1 tab PO BID #8 tab


Carvedilol [Coreg] 3.125 mg PO BID #60 tab


Losartan [Cozaar] 50 mg PO DAILY #30 tab


Rosuvastatin Calcium [Crestor] 10 mg PO HS #30 tab


Aspirin [Ecotrin] 81 mg PO DAILY #30 tabec


Furosemide [Lasix] 20 mg PO DAILY #14 tab


hydroCHLOROthiazide [Microzide] 12.5 mg PO DAILY #30 cap





- Follow Up Plan


Condition: STABLE


Disposition: HOME/ ROUTINE


Instructions:  Furosemide (By mouth), Hydrochlorothiazide (By mouth), Aspirin (

By mouth), Amoxicillin/Clavulanate Potassium (By mouth), Losartan (By mouth), 

Carvedilol (By mouth), Rosuvastatin (By mouth), Heart Failure (DC), Heart 

Healthy Diet (DC), COPD (Chronic Obstructive Pulmonary Disease) (DC), Pneumonia 

(DC)


Additional Instructions: 


Please follow up with primary medical doctor. Dr. Divine Handley, in her 

office on Monday, 4/24/17, at 4:00 pm. Please take new medications as prescribed

, Augmentin, Coreg, Cozaar, Crestor, Aspirin, Lasix, and HCTZ. Please resume 

home medications Linzess, Victoza, and Levemir. If symptoms worsen, or new 

symptoms arise, please return to the hospital. 


Referrals: 


Divine Handley MD [Family Provider] - 





Clinical Quality Measures





- CQM - Heart Failure


Ejection Fraction: 40 % or Greater


Left Ventricular Function to be assessed after discharge: Yes


ACE Inhibitor Prescribed: No


Contraindication/Reason for not providing: on ARB


Beta-Blocker Prescribed: None


Contraindication/Reason for not providing: as per cardio


Angiotensin II Receptor Blocker Prescribed: Yes


AnticoagulationTherapy for Atrial Fibrillation/Atrialflutter: No


Contraindication/Reason for not providing: no arrthymia


Aldosterone Antagonist Prescribed: No


Contraindication/Reason for not providing: as per cardio


Hydralazine Nitrate Prescribed: No


Contraindication/Reason for not providing: on ARB


Implantable Cardioverter Defibrillator Therapy: No


Contraindication/Reason for not providing: as per cardio


Cardiac Resynchronization Therapy Prescribed: No


Contraindication/Reason for not providing: asd per cardio


Will be discharged to: Home


Follow Up Date (must be within 7 days from discharge): 04/25/17


Follow Up Time: 09:00





<TrippJorge escobedo H - Last Filed: 04/23/17 10:05>





Provider





- Provider


Date of Admission: 


04/16/17 11:47





Attending physician: 


Jude Pate MD








Hospital Course





- Lab Results


Lab Results: 


 Micro Results





04/17/17 05:41   Sputum   Gram Stain - Final


04/17/17 05:41   Sputum   Sputum Culture - Final


                            NORMAL ORAL SASKIA


04/17/17 06:08   Urine,Clean Catch   Urine Culture - Final


                            No Growth (<1,000 CFU/ML)





 Most Recent Lab Values











WBC  11.6 K/uL (4.8-10.8)  H  04/21/17  07:17    


 


RBC  5.26 Mil/uL (4.40-5.90)   04/21/17  07:17    


 


Hgb  12.4 g/dL (12.0-18.0)   04/21/17  07:17    


 


Hct  39.5 % (35.0-51.0)   04/21/17  07:17    


 


MCV  75.1 fL (80.0-94.0)  L  04/21/17  07:17    


 


MCH  23.6 pg (27.0-31.0)  L  04/21/17  07:17    


 


MCHC  31.4 g/dL (33.0-37.0)  L  04/21/17  07:17    


 


RDW  16.5 % (11.5-14.5)  H  04/21/17  07:17    


 


Plt Count  267 K/uL (130-400)   04/21/17  07:17    


 


MPV  9.5 fL (7.2-11.7)   04/21/17  07:17    


 


Neut % (Auto)  57.3 % (50.0-75.0)   04/21/17  07:17    


 


Lymph % (Auto)  18.9 % (20.0-40.0)  L  04/21/17  07:17    


 


Mono % (Auto)  8.7 % (0.0-10.0)   04/21/17  07:17    


 


Eos % (Auto)  14.6 % (0.0-4.0)  H  04/21/17  07:17    


 


Baso % (Auto)  0.5 % (0.0-2.0)   04/21/17  07:17    


 


Neut #  6.6 K/uL (1.8-7.0)   04/21/17  07:17    


 


Lymph #  2.2 K/uL (1.0-4.3)   04/21/17  07:17    


 


Mono #  1.0 K/uL (0.0-0.8)  H  04/21/17  07:17    


 


Eos #  1.7 K/uL (0.0-0.7)  H  04/21/17  07:17    


 


Baso #  0.1 K/uL (0.0-0.2)   04/21/17  07:17    


 


Neutrophils % (Manual)  71 % (50-75)   04/18/17  06:35    


 


Band Neutrophils %  21 % (0-2)  H*  04/18/17  06:35    


 


Lymphocytes % (Manual)  3 % (20-40)  L  04/18/17  06:35    


 


Monocytes % (Manual)  4 % (0-10)   04/18/17  06:35    


 


Eosinophils % (Manual)  1 % (0-4)   04/18/17  06:35    


 


Platelet Estimate  Normal  (NORMAL)   04/18/17  06:35    


 


Large Platelets  Present   04/17/17  07:08    


 


Polychromasia  Slight   04/18/17  06:35    


 


Hypochromasia (manual)  Slight   04/18/17  06:35    


 


Poikilocytosis (manual  Slight   04/16/17  09:19    


 


Anisocytosis (manual)  Slight   04/18/17  06:35    


 


Microcytosis (manual)  Slight   04/18/17  06:35    


 


Ovalocytes  Slight   04/18/17  06:35    


 


PT  14.2 SECONDS (9.7-12.2)  H  04/17/17  07:08    


 


INR  1.3   04/17/17  07:08    


 


APTT  28 SECONDS (21-34)   04/17/17  07:08    


 


D-Dimer, Quantitative  862 ng/mlDDU (0-243)  H  04/17/17  01:30    


 


Puncture Site  Lr   04/18/17  10:08    


 


pCO2  34 mm/Hg (35-45)  L  04/18/17  10:08    


 


pO2  51 mm/Hg (30-55)   04/18/17  22:10    


 


HCO3  24.2 mmol/L (21-28)   04/18/17  10:08    


 


ABG pH  7.44  (7.35-7.45)   04/18/17  10:08    


 


ABG Total CO2  24.1 mmol/L (22-28)   04/18/17  10:08    


 


ABG O2 Saturation  90.2 % (95-98)  L  04/18/17  10:08    


 


ABG Base Excess  -0.5 mmol/L (-2.0-3.0)   04/18/17  10:08    


 


Alphonso Test  Po   04/18/17  10:08    


 


ABG Potassium  4.6 mmol/L (3.6-5.2)   04/18/17  10:08    


 


VBG pH  7.45  (7.32-7.43)  H  04/18/17  22:10    


 


VBG pCO2  30 mmHg (40-60)  L  04/18/17  22:10    


 


VBG HCO3  23.0 mmol/L  04/18/17  22:10    


 


VBG Total CO2  21.8 mmol/L (22-28)  L  04/18/17  22:10    


 


VBG O2 Sat (Calc)  88.2 % (40-65)  H  04/18/17  22:10    


 


VBG Base Excess  -2.1 mmol/L (0.0-2.0)  L  04/18/17  22:10    


 


VBG Potassium  4.2 mmol/L (3.6-5.2)   04/18/17  22:10    


 


A-a O2 Difference  57.0 mm/Hg  04/18/17  10:08    


 


Respiratory Index  1.1   04/18/17  10:08    


 


Sodium  137.0 mmol/l (132-148)   04/18/17  22:10    


 


Chloride  108.0 mmol/L ()  H  04/18/17  22:10    


 


Glucose  353 mg/dl ()  H  04/18/17  22:10    


 


Lactate  2.0 mmol/L (0.7-2.1)   04/18/17  22:10    


 


FiO2  21.0 %  04/18/17  10:08    


 


Sodium  131 mmol/L (132-148)  L  04/21/17  07:17    


 


Potassium  4.6 mmol/L (3.6-5.2)   04/21/17  07:17    


 


Chloride  98 mmol/L ()   04/21/17  07:17    


 


Carbon Dioxide  24 mmol/L (22-30)   04/21/17  07:17    


 


Anion Gap  14  (10-20)   04/21/17  07:17    


 


BUN  19 mg/dL (9-20)   04/21/17  07:17    


 


Creatinine  0.9 MG/DL (0.8-1.5)   04/21/17  07:17    


 


Est GFR ( Amer)  > 60   04/21/17  07:17    


 


Est GFR (Non-Af Amer)  > 60   04/21/17  07:17    


 


POC Glucose (mg/dL)  299 mg/dL ()  H  04/21/17  11:12    


 


Random Glucose  371 mg/dL ()  H  04/21/17  07:17    


 


Lactic Acid  1.8 mmol/L (0.7-2.1)   04/21/17  07:17    


 


Calcium  8.3 mg/dl (8.6-10.4)  L  04/21/17  07:17    


 


Phosphorus  3.7 mg/dL (2.5-4.5)   04/21/17  07:17    


 


Magnesium  1.9 mg/dL (1.6-2.3)   04/21/17  07:17    


 


Total Bilirubin  0.8 mg/dL (0.2-1.3)   04/21/17  07:17    


 


AST  19 U/L (17-59)   04/21/17  07:17    


 


ALT  69 U/L (21-72)   04/21/17  07:17    


 


Alkaline Phosphatase  76 U/L ()   04/21/17  07:17    


 


Total Creatine Kinase  88 U/L ()   04/16/17  22:26    


 


CK-MB (Mass)  2.37 ng/mL (0.0-3.38)   04/16/17  22:26    


 


Troponin I  0.0220 ng/mL (0.00-0.120)   04/16/17  09:19    


 


Troponin I, Quant  0.0390 ng/mL (0.00-0.120)   04/16/17  22:26    


 


NT-Pro-B Natriuret Pep  48.0 pg/mL (0-900)   04/16/17  09:19    


 


Total Protein  6.4 g/dL (6.3-8.3)   04/21/17  07:17    


 


Albumin  3.3 g/dL (3.5-5.0)  L  04/21/17  07:17    


 


Globulin  3.1 gm/dL (2.2-3.9)   04/21/17  07:17    


 


Albumin/Globulin Ratio  1.1  (1.0-2.1)   04/21/17  07:17    


 


Triglycerides  147 mg/dL (0-149)  D 04/16/17  14:29    


 


Cholesterol  141 mg/dL (0-199)   04/16/17  14:29    


 


LDL Cholesterol Direct  40 mg/dL (0-129)   04/16/17  14:29    


 


HDL Cholesterol  56 mg/dL (30-70)   04/16/17  14:29    


 


Prostate Specific Ag  3.87 ng/mL (0.00-4.0)   04/18/17  21:28    


 


Procalcitonin  2.09 NG/ML (0.19-0.49)  H  04/20/17  10:01    


 


Arterial Blood Potassium  4.6 mmol/L (3.6-5.2)   04/18/17  10:08    


 


Venous Blood Potassium  4.2 mmol/L (3.6-5.2)   04/18/17  22:10    


 


Ur L.pneumophila Ag  Negative  (NEGATIVE)   04/19/17  23:01    


 


Mycoplasma pneumon IgM  Negative  (NEGATIVE)   04/20/17  08:33    














Attending/Attestation





- Attestation


I have personally seen and examined this patient.: Yes


I have fully participated in the care of the patient.: Yes


I have reviewed all pertinent clinical information, including history, physical 

exam and plan: Yes


Notes (Text): 








Medical Attending: Patient was seen and examined by me. Agree with the above 

note by the resident. 


I spoke with the patient's PMD Dr FLAVIO Handley and made her aware of DC. I also 

made the patient aware of follow up appt at 4PM this comming Monday 4/24.


Patient will need to take additional days of PO abx as well as his other 

medications





As reported above, the patient has been ambulating on his own with a rolling 

walker - and he has been observed doing it very quickly. He does not become 

short of breath, he does not have CP when he ambulates





thank you


Jorge Tripp

## 2017-04-21 NOTE — CP.PCM.PN
Subjective





- Date & Time of Evaluation


Date of Evaluation: 04/21/17


Time of Evaluation: 08:48





- Subjective


Subjective: 





no complaints no cp





Objective





- Vital Signs/Intake and Output


Vital Signs (last 24 hours): 


 











Temp Pulse Resp BP Pulse Ox


 


 97.8 F   98 H  20   133/77   93 L


 


 04/21/17 07:10  04/21/17 07:10  04/21/17 07:10  04/21/17 07:10  04/21/17 07:10








Intake and Output: 


 











 04/21/17 04/21/17





 06:59 18:59


 


Output Total 600 


 


Balance -600 














- Medications


Medications: 


 Current Medications





Albuterol/Ipratropium (Duoneb 3 Mg/0.5 Mg (3 Ml) Ud)  3 ml INH RQ4 Pending sale to [facility]


   Last Admin: 04/21/17 07:58 Dose:  3 ml


Artificial Tears (Artificial Tears)  1 ml OU Q4 PRN


   PRN Reason: Dry eyes


   Last Admin: 04/20/17 08:27 Dose:  1 drop


Aspirin (Ecotrin)  81 mg PO DAILY Pending sale to [facility]


   Last Admin: 04/20/17 09:48 Dose:  81 mg


Docusate Sodium (Colace)  100 mg PO DAILY Pending sale to [facility]


   Last Admin: 04/20/17 09:48 Dose:  100 mg


Furosemide (Lasix)  20 mg IVP DAILY Pending sale to [facility]


   Last Admin: 04/20/17 09:48 Dose:  20 mg


Heparin Sodium (Porcine) (Heparin)  5,000 units SC Q8 Pending sale to [facility]


   Last Admin: 04/21/17 06:46 Dose:  5,000 units


Hydrochlorothiazide (Microzide)  12.5 mg PO DAILY Pending sale to [facility]


   Last Admin: 04/20/17 09:48 Dose:  12.5 mg


Vancomycin HCl 1,000 mg/ (Sodium Chloride)  250 mls @ 166.6 mls/hr IVPB Q12H Pending sale to [facility]


   Last Admin: 04/21/17 00:01 Dose:  166.6 mls/hr


Azithromycin 500 mg/ Sodium (Chloride)  250 mls @ 166.667 mls/hr IVPB Q24H Pending sale to [facility]


   Last Admin: 04/20/17 18:56 Dose:  166.667 mls/hr


Imipenem/Cilastatin Sodium 500 (mg/ Sodium Chloride)  100 mls @ 100 mls/hr IVPB 

Q6H Pending sale to [facility]


   Last Admin: 04/21/17 02:36 Dose:  100 mls/hr


Insulin Aspart (Novolog)  10 unit SC AC Pending sale to [facility]


   Last Admin: 04/20/17 18:20 Dose:  10 unit


Insulin Glargine (Lantus)  20 unit SC Q12H Pending sale to [facility]


   Last Admin: 04/21/17 06:45 Dose:  20 units


Insulin Human Regular (Novolin R)  0 unit SC ACHS Pending sale to [facility]


   PRN Reason: Protocol


   Last Admin: 04/20/17 22:30 Dose:  2 unit


Lactulose (Enulose)  20 gm PO Q4H Pending sale to [facility]


   Last Admin: 04/20/17 21:26 Dose:  Not Given


Losartan Potassium (Cozaar)  50 mg PO DAILY Pending sale to [facility]


   Last Admin: 04/20/17 09:48 Dose:  50 mg


Ondansetron HCl (Zofran Inj)  4 mg IVP Q6 PRN


   PRN Reason: Nausea/Vomiting


Pantoprazole Sodium (Protonix Ec Tab)  40 mg PO DAILY Pending sale to [facility]


   Last Admin: 04/20/17 09:48 Dose:  40 mg


Rosuvastatin Calcium (Crestor)  10 mg PO HS Pending sale to [facility]


   Last Admin: 04/20/17 21:25 Dose:  10 mg


Saccharomyces Boulardii (Florastor)  250 mg PO BID Pending sale to [facility]


   Last Admin: 04/20/17 18:20 Dose:  250 mg


Zolpidem Tartrate (Ambien)  5 mg PO HS PRN


   PRN Reason: Insomnia


   Last Admin: 04/20/17 22:29 Dose:  5 mg











- Labs


Labs: 


 





 04/21/17 07:17 





 04/21/17 07:17 





 











PT  14.2 SECONDS (9.7-12.2)  H  04/17/17  07:08    


 


INR  1.3   04/17/17  07:08    


 


APTT  28 SECONDS (21-34)   04/17/17  07:08    














- Constitutional


Appears: Well





- Head Exam


Head Exam: NORMOCEPHALIC





- Eye Exam


Eye Exam: Normal appearance





- ENT Exam


ENT Exam: Mucous Membranes Moist





- Respiratory Exam


Respiratory Exam: Clear to Ausculation Bilateral





- Cardiovascular Exam


Cardiovascular Exam: REGULAR RHYTHM





- GI/Abdominal Exam


GI & Abdominal Exam: Normal Bowel Sounds





-  Exam


External exam: NORMAL EXTERNAL EXAM





- Extremities Exam


Extremities Exam: Normal Inspection





- Neurological Exam


Neurological Exam: Alert, Awake





- Psychiatric Exam


Psychiatric exam: Normal Mood





- Skin


Skin Exam: Dry





Assessment and Plan


(1) Chest pain


Assessment & Plan: 


continue asa and crestor rate controlled


Status: Acute   





(2) Sinus tachycardia


Status: Acute

## 2017-04-21 NOTE — PN
DATE: 04/20/2017



The patient says he is feeling better.  He is breathing easier.  He has no fever.



PHYSICAL EXAMINATION:

VITAL SIGNS:  Temperature is 98, pulse was still 98, respirations are 20, blood pressure 132/77 and h
e says his legs were not hurting anymore, saturation is 94%.

HEENT:  Head is atraumatic, normocephalic.

NECK:  Supple.

LUNGS:  Decreased breath sounds bilaterally.

HEART:  S1, S2 is regular.

ABDOMEN:  Soft, prominent.  No guarding, no rigidity present.

EXTREMITIES:  Have no edema, clubbing, or cyanosis at this time.



His white count is 11.2, hemoglobin is 13.3, hematocrit 42.3, platelet count is 257 and eosinophil co
unts are 4.4.  Sodium is 133, potassium is 4.1, chloride is 98, CO2 is 24, BUN is 15, creatinine 0.9.
  His sugars are still not controlled, is 367.  Legionella, mycoplasma all came negative.  His white 
count has now decreased.  His ABG is being monitored.



Hematology wise, his white count has come down significantly and he is on DuoNeb, he is on Zithromax 
and he is on imipenem, so we started on 17th.  Today is the fourth day, so I would give him for 5-7 d
ays and he also had an abdominal angiography done and the angiography shows pelvis and bilateral ____
_ it needs to be read by the vascular.  I cannot make anything out of this, but he is being treated f
or pneumonia and he is on other medications for hypertension and he is diabetic and he is on a lot of
 antibiotics at this time and we do not have a sputum to go by.  Sputum came back normal nimo, so we
 will see if we have to give him 5-7 days to make sure he is breathing better.





__________________________________________

Patricia Finley MD







cc:



DD: 04/20/2017 18:10:55  [id number]

TT: 04/20/2017 18:36:49

Confirmation # [id number]

Dictation # [id number]

en

## 2017-04-23 VITALS — OXYGEN SATURATION: 94 %

## 2017-08-06 ENCOUNTER — HOSPITAL ENCOUNTER (EMERGENCY)
Dept: HOSPITAL 31 - C.ER | Age: 67
Discharge: HOME | End: 2017-08-06
Payer: MEDICARE

## 2017-08-06 VITALS
TEMPERATURE: 97.6 F | HEART RATE: 98 BPM | RESPIRATION RATE: 18 BRPM | OXYGEN SATURATION: 97 % | SYSTOLIC BLOOD PRESSURE: 138 MMHG | DIASTOLIC BLOOD PRESSURE: 72 MMHG

## 2017-08-06 VITALS — BODY MASS INDEX: 31.6 KG/M2

## 2017-08-06 DIAGNOSIS — J20.9: Primary | ICD-10-CM

## 2017-08-06 DIAGNOSIS — R25.2: ICD-10-CM

## 2017-08-06 PROCEDURE — 93971 EXTREMITY STUDY: CPT

## 2017-08-06 PROCEDURE — 99285 EMERGENCY DEPT VISIT HI MDM: CPT

## 2017-08-06 PROCEDURE — 96372 THER/PROPH/DIAG INJ SC/IM: CPT

## 2017-08-06 PROCEDURE — 71020: CPT

## 2017-10-28 ENCOUNTER — HOSPITAL ENCOUNTER (EMERGENCY)
Dept: HOSPITAL 31 - C.ER | Age: 67
Discharge: HOME | End: 2017-10-28
Payer: MEDICARE

## 2017-10-28 VITALS
DIASTOLIC BLOOD PRESSURE: 70 MMHG | HEART RATE: 71 BPM | TEMPERATURE: 98.2 F | RESPIRATION RATE: 16 BRPM | SYSTOLIC BLOOD PRESSURE: 145 MMHG

## 2017-10-28 VITALS — BODY MASS INDEX: 33.9 KG/M2

## 2017-10-28 VITALS — OXYGEN SATURATION: 95 %

## 2017-10-28 DIAGNOSIS — E11.9: ICD-10-CM
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
## 2017-10-28 DIAGNOSIS — J44.9: ICD-10-CM

## 2017-10-28 DIAGNOSIS — E78.00: ICD-10-CM

## 2017-10-28 DIAGNOSIS — I10: ICD-10-CM

## 2017-10-28 DIAGNOSIS — S86.912A: Primary | ICD-10-CM

## 2017-10-28 NOTE — C.PDOC
History Of Present Illness


Pt is a 67 yr old male with PMHx of diabetes, cholesterol and HTN, presents to 

the ER with complaints of pain to the left calf for past 1 and half months. 

Patient states he has been to the doctor but still don't know the cause of the 

pain. Patient reports taking Tylenol with no relief, also when he walks the 

pain increases and he can't walk for a long distance due to the pain. There is 

no radiation of the pain; it is only in the left calf.  Patient denies fever, 

chest pain, SOB, back pain, foot pain, weakness or numbness. 





PMD: DR. Handley 


Time Seen by Provider: 10/28/17 10:13


Chief Complaint (Nursing): Lower Extremity Problem/Injury


History Per: Patient,  ([id number])


History/Exam Limitations: no limitations, language barrier (Vietnamese)


Onset/Duration Of Symptoms: Persistent


Pain Scale Rating Of: 9





Past Medical History


Reviewed: Historical Data, Nursing Documentation, Vital Signs


Vital Signs: 


 Last Vital Signs











Temp  98.2 F   10/28/17 12:06


 


Pulse  71   10/28/17 12:06


 


Resp  16   10/28/17 12:06


 


BP  145/70   10/28/17 12:06


 


Pulse Ox  95   10/28/17 12:07














- Medical History


PMH: Arthritis (B/L), Asthma, COPD, Diabetes, Gastritis, HTN, 

Hypercholesterolemia





- [facility] Procedures








CONTRAST ARTERIOGRAM-LEG (09/24/15)


INSERT INT PENILE PROSTH (07/21/15)


PLAIN RADIOGRAPHY OF AORTA, BI LE ART USING L OSM CONTRAST (08/19/16)








Family History: States: CAD





- Social History


Hx Tobacco Use: No


Hx Alcohol Use: No


Hx Substance Use: No





- Immunization History


Hx Tetanus Toxoid Vaccination: No


Hx Influenza Vaccination: No


Hx Pneumococcal Vaccination: No





Review Of Systems


Except As Marked, All Systems Reviewed And Found Negative.


Constitutional: Negative for: Fever


Cardiovascular: Negative for: Chest Pain


Respiratory: Negative for: Shortness of Breath


Musculoskeletal: Positive for: Other ((+) Left calf pain.).  Negative for: Back 

Pain, Foot Pain


Neurological: Negative for: Weakness, Numbness





Physical Exam





- Physical Exam


Appears: Well, Non-toxic, No Acute Distress


Skin: Warm, Dry, No Rash


Head: Atraumatic, Normacephalic


Ear(s): Bilateral: Normal


Nose: Normal


Lips: Normal Appearing


Lymphatic: Deferred


Chest: Symmetrical, No Tenderness


Cardiovascular: Rhythm Regular, No Murmur


Respiratory: Normal Breath Sounds, No Rales, No Rhonchi, No Stridor, No Wheezing


Extremity: Calf Tenderness (Left calf), Other ((+) Left Calf - Mild edema 

compared to right.  No neurovascular deficits)


Extremity: Bilateral: Atraumatic, Normal ROM


Neurological/Psych: Oriented x3, Normal Speech


Gait: Steady (with cane)





ED Course And Treatment


O2 Sat by Pulse Oximetry: 95 (RA)


Pulse Ox Interpretation: Normal





- Other Rad


  ** Dopplar


Interpretation: Negative





Medical Decision Making


Medical Decision Making: 


INITIAL IMPRESSION: 


Calf strain vs. DVT





INITIAL PLAN:


Venous Duplex; Toradol IM 





Progress note:


No DVT on printed prelim report.  Pt feels better.  Will d/c home.  Pt should 

follow up w/ his PMD.














Disposition





- Disposition


Referrals: 


Divine Handley MD [Staff Provider] - 


Disposition: HOME/ ROUTINE


Disposition Time: 11:46


Condition: FAIR


Additional Instructions: 


Mr. Daniels, thank you for letting us take care of you today.





Return to the ER if your symptoms worsen, or if any problems.





Follow up with Dr. Handley next week for a re-evaluation.





Take the medication listed below as prescribed.








Prescriptions: 


Ibuprofen [Motrin] 1 tab PO TID PRN #30 tab


 PRN Reason: Pain, Moderate (4-7)


Instructions:  Muscle Strain (ED), Leg Cramps (ED)


Forms:  [facility] (English)


Print Language: ENGLISH





- POA


Present On Arrival: None





- Clinical Impression


Clinical Impression: 


 Strain of calf muscle








- Scribe Statement


The provider has reviewed the documentation as recorded by the Bhargav Stover


Provider Attestation: 


All medical record entries made by the Bhargav were at my direction and 

personally dictated by me. I have reviewed the chart and agree that the record 

accurately reflects my personal performance of the history, physical exam, 

medical decision making, and the department course for this patient. I have 

also personally directed, reviewed, and agree with the discharge instructions 

and disposition.

## 2017-10-30 NOTE — VASCLAB
PROCEDURE:  Left Lower Extremity Venous Duplex Exam. 



HISTORY:

Left calf pain r/o dvt 



PRIORS:

None. 



TECHNIQUE:

Left common femoral, femoral, popliteal and posterior tibial, 

peroneal and great saphenous veins were evaluated. Flow was assessed 

with color Doppler, compressibility, assessment of phasic flow and 

augmentation response.



Report prepared by   ABELARDO Marley



FINDINGS:



LEFT:

1. Common Femoral Vein:



1.1.  Compressibility - Fully compressible: Thrombus - None : Flow - 

Phasic: Augmentation -Normal: Reflux - None.



2. Femoral Vein: 



2.1. Compressibility - Fully compressible: Thrombus -  None: Flow - 

Phasic: Augmentation -Normal: Reflux - None.



3. Popliteal Vein: 



3.1. Compressibility - Fully compressible: Thrombus -  None: Flow - 

Phasic: Augmentation -Normal: Reflux - None.



4. Posterior Tibial Vein: 



4.1. Compressibility - Fully compressible: Thrombus -  None: Flow - 

Phasic: Augmentation -Normal: Reflux - None.



5. Peroneal Vein: 



5.1. Compressibility - Fully compressible: Thrombus -  None: Flow - 

Phasic: Augmentation -Normal: Reflux - None.



6. Great Saphenous Vein:

6.1.  Compressibility - Fully compressible: Thrombus - None: Flow - 

Phasic: Augmentation - Normal: Reflux - None.





OTHER FINDINGS:  



IMPRESSION:

No evidence of deep or superficial vein thrombosis of the left lower 

extremity with excellent venous flow. Normal valve function noted of 

the left side.     



Normal venous flow noted in the right common femoral vein.

## 2018-03-13 ENCOUNTER — HOSPITAL ENCOUNTER (EMERGENCY)
Dept: HOSPITAL 31 - C.ER | Age: 68
Discharge: HOME | End: 2018-03-13
Payer: MEDICARE

## 2018-03-13 VITALS — BODY MASS INDEX: 33.9 KG/M2

## 2018-03-13 VITALS — HEART RATE: 94 BPM | DIASTOLIC BLOOD PRESSURE: 67 MMHG | SYSTOLIC BLOOD PRESSURE: 129 MMHG

## 2018-03-13 VITALS — OXYGEN SATURATION: 92 %

## 2018-03-13 VITALS — TEMPERATURE: 98.2 F | RESPIRATION RATE: 20 BRPM

## 2018-03-13 DIAGNOSIS — I10: ICD-10-CM

## 2018-03-13 DIAGNOSIS — J45.909: ICD-10-CM

## 2018-03-13 DIAGNOSIS — E78.00: ICD-10-CM

## 2018-03-13 DIAGNOSIS — I25.10: ICD-10-CM

## 2018-03-13 DIAGNOSIS — E11.9: ICD-10-CM

## 2018-03-13 DIAGNOSIS — I50.9: ICD-10-CM

## 2018-03-13 DIAGNOSIS — M25.561: Primary | ICD-10-CM

## 2018-03-13 LAB
ALBUMIN SERPL-MCNC: 4.2 G/DL (ref 3.5–5)
ALBUMIN/GLOB SERPL: 1.2 {RATIO} (ref 1–2.1)
ALT SERPL-CCNC: 34 U/L (ref 21–72)
ANISOCYTOSIS BLD QL SMEAR: SLIGHT
AST SERPL-CCNC: 25 U/L (ref 17–59)
BASOPHILS # BLD AUTO: 0.2 K/UL (ref 0–0.2)
BASOPHILS NFR BLD: 1.3 % (ref 0–2)
BNP SERPL-MCNC: 24.5 PG/ML (ref 0–900)
BUN SERPL-MCNC: 25 MG/DL (ref 9–20)
CALCIUM SERPL-MCNC: 9.1 MG/DL (ref 8.6–10.4)
EOSINOPHIL # BLD AUTO: 3.9 K/UL (ref 0–0.7)
EOSINOPHIL NFR BLD: 29 % (ref 0–4)
EOSINOPHIL NFR BLD: 32 % (ref 0–4)
ERYTHROCYTE [DISTWIDTH] IN BLOOD BY AUTOMATED COUNT: 17.2 % (ref 11.5–14.5)
GFR NON-AFRICAN AMERICAN: > 60
HGB BLD-MCNC: 13.9 G/DL (ref 12–18)
HYPOCHROMIC: SLIGHT
LIPASE: 130 U/L (ref 23–300)
LYMPHOCYTE: 23 % (ref 20–40)
LYMPHOCYTES # BLD AUTO: 2.2 K/UL (ref 1–4.3)
LYMPHOCYTES NFR BLD AUTO: 18.4 % (ref 20–40)
MCH RBC QN AUTO: 23.7 PG (ref 27–31)
MCHC RBC AUTO-ENTMCNC: 32.1 G/DL (ref 33–37)
MCV RBC AUTO: 73.9 FL (ref 80–94)
MICROCYTES BLD QL SMEAR: SLIGHT
MONOCYTE: 4 % (ref 0–10)
MONOCYTES # BLD: 1 K/UL (ref 0–0.8)
MONOCYTES NFR BLD: 8.5 % (ref 0–10)
NEUTROPHILS # BLD: 4.8 K/UL (ref 1.8–7)
NEUTROPHILS NFR BLD AUTO: 39.8 % (ref 50–75)
NEUTROPHILS NFR BLD AUTO: 43 % (ref 50–75)
NEUTS BAND NFR BLD: 1 % (ref 0–2)
NRBC BLD AUTO-RTO: 0 % (ref 0–2)
PLATELET # BLD EST: NORMAL 10*3/UL
PLATELET # BLD: 234 K/UL (ref 130–400)
PMV BLD AUTO: 10 FL (ref 7.2–11.7)
POLYCHROMIC: SLIGHT
RBC # BLD AUTO: 5.86 MIL/UL (ref 4.4–5.9)
TOTAL CELLS COUNTED BLD: 100
WBC # BLD AUTO: 12.1 K/UL (ref 4.8–10.8)

## 2018-03-13 PROCEDURE — 73562 X-RAY EXAM OF KNEE 3: CPT

## 2018-03-13 PROCEDURE — 83880 ASSAY OF NATRIURETIC PEPTIDE: CPT

## 2018-03-13 PROCEDURE — 71045 X-RAY EXAM CHEST 1 VIEW: CPT

## 2018-03-13 PROCEDURE — 80053 COMPREHEN METABOLIC PANEL: CPT

## 2018-03-13 PROCEDURE — 87804 INFLUENZA ASSAY W/OPTIC: CPT

## 2018-03-13 PROCEDURE — 82948 REAGENT STRIP/BLOOD GLUCOSE: CPT

## 2018-03-13 PROCEDURE — 94150 VITAL CAPACITY TEST: CPT

## 2018-03-13 PROCEDURE — 83690 ASSAY OF LIPASE: CPT

## 2018-03-13 PROCEDURE — 93005 ELECTROCARDIOGRAM TRACING: CPT

## 2018-03-13 PROCEDURE — 93970 EXTREMITY STUDY: CPT

## 2018-03-13 PROCEDURE — 71275 CT ANGIOGRAPHY CHEST: CPT

## 2018-03-13 PROCEDURE — 85378 FIBRIN DEGRADE SEMIQUANT: CPT

## 2018-03-13 PROCEDURE — 84484 ASSAY OF TROPONIN QUANT: CPT

## 2018-03-13 PROCEDURE — 85025 COMPLETE CBC W/AUTO DIFF WBC: CPT

## 2018-03-13 PROCEDURE — 99285 EMERGENCY DEPT VISIT HI MDM: CPT

## 2018-03-13 PROCEDURE — 94640 AIRWAY INHALATION TREATMENT: CPT

## 2018-03-13 NOTE — RAD
PROCEDURE:  Right Knee Radiographs.



HISTORY:

knee pian



COMPARISON:

3/30/2016



FINDINGS:



BONES:

No fracture or lytic lesion. 



JOINTS:

Osteoarthrosis medial lateral compartments fairly similar. Lateral 

tibial spine spurring. Atherosclerotic vascular calcifications 

project over the intercondylar fossa.  A proximal vascular graft near 

the superior edge of this image is noted 



JOINT EFFUSION:

None. 



OTHER FINDINGS:

None.



IMPRESSION:

Arthrosis



Atherosclerotic vascular calcifications and graft 



No fracture or lytic lesion

## 2018-03-13 NOTE — CT
PROCEDURE:  CT Chest with contrast (Pulmonary Angiogram)



HISTORY:

Mild elevation of D-dimer, SOB and tachycardia



COMPARISON:

None available. 



TECHNIQUE:

Axial computed tomography images were obtained of the chest in the 

pulmonary arterial phase of enhancement. Coronal and sagittal 

reformatted images were created and reviewed.



Intravenous contrast dose: 100 mL Visipaque 320



Radiation dose:



Total exam DLP = 588.96 mGy-cm.



This CT exam was performed using one or more of the following dose 

reduction techniques: Automated exposure control, adjustment of the 

mA and/or kV according to patient size, and/or use of iterative 

reconstruction technique.



FINDINGS:



PULMONARY ARTERIES:

Unremarkable. No pulmonary embolism. 



AORTA:

No acute findings. No thoracic aortic aneurysm. 



LUNGS:

Previously noted multi lobar right-sided pulmonary infiltrate has 

resolved.  There is nonspecific heterogeneous ground-glass opacity in 

both lower lobes. No consolidation. 



PLEURAL SPACES:

Unremarkable. No effusion or pneuomothorax. 



HEART:

Unremarkable. No cardiomegaly. No significant pericardial effusion. 



LYMPH NODES:

No lymphadenopathy.



BONES, CHEST WALL:

Unremarkable. No fracture or destructive lesion 



OTHER FINDINGS:

Small hiatal hernia. 



IMPRESSION:

No evidence of pulmonary embolism.  Small hiatal hernia.  Nonspecific 

heterogeneous ground-glass opacity in both lower lobes.

## 2018-03-13 NOTE — VASCLAB
PROCEDURE:  Lower Extremity Venous Duplex Exam.



HISTORY:

Bilateral calf tenderness 



PRIORS:

None. 



TECHNIQUE:

Bilateral common femoral, femoral, popliteal and posterior tibial, 

peroneal and great saphenous veins were evaluated. Flow was assessed 

with color Doppler, compressibility, assessment of phasic flow and 

augmentation response.



Report prepared by   Javier Navarrete, REGAN, RVT



FINDINGS:



RIGHT:

1. Common Femoral Vein: 



1.1. Compressibility - Fully compressible: Thrombus -  None : Flow - 

Phasic: Augmentation -Normal: Reflux - None.



2. Femoral Vein:



2.1. Compressibility - Fully compressible: Thrombus -  None : Flow - 

Phasic: Augmentation -Normal: Reflux - None.



3. Popliteal Vein: 



3.1. Compressibility - Fully compressible: Thrombus - None :  Flow - 

Phasic: Augmentation -Normal: Reflux - None.



4. Posterior Tibial Vein: 



4.1. Compressibility - Fully compressible: Thrombus -  None: Flow - 

Phasic: Augmentation -Normal: Reflux - None.



5. Peroneal Vein:



5.1. Compressibility - Fully compressible: Thrombus -  None: Flow - 

Phasic: Augmentation -Normal: Reflux - None.



6. Great Saphenous Vein:

6.1. Compressibility - Fully compressible: Thrombus - None: Flow - 

Phasic: Augmentation - Normal: Reflux - None.





LEFT:

1. Common Femoral Vein:



1.1.  Compressibility - Fully compressible: Thrombus -  None: Flow - 

Phasic: Augmentation -Normal: Reflux - None.



2. Femoral Vein:



2.1.  Compressibility - Fully compressible: Thrombus -  None: Flow - 

Phasic: Augmentation -Normal: Reflux - None.



3. Popliteal Vein:



3.1.  Compressibility - Fully compressible: Thrombus -  None : Flow - 

Phasic: Augmentation -Normal: Reflux - None.



4. Posterior Tibial Vein:



4.1.  Compressibility - Fully compressible: Thrombus -  None: Flow - 

Phasic: Augmentation -Normal: Reflux - None.



5. Peroneal Vein:



5.1.  Compressibility - Fully compressible: Thrombus -  None: Flow - 

Phasic: Augmentation -Normal: Reflux - None.



6. Great Saphenous Vein:

6.1.  Compressibility - Fully compressible: Thrombus -  None: Flow - 

Phasic: Augmentation - Normal: Reflux - None.





OTHER FINDINGS:  Right: None significant.



Left: None significant.



IMPRESSION:

Right: 



No evidence of deep or superficial vein thrombosis of the right lower 

extremity. Normal valve function noted of the right side.     



Left: 



No evidence of deep or superficial vein thrombosis of the left lower 

extremity. Normal valve function noted of the left side.

## 2018-03-13 NOTE — RAD
HISTORY:

shortness of breath  



COMPARISON:

08/06/2017 



FINDINGS:



LUNGS:

Chronic interstitial lung disease.



PLEURA:

No significant pleural effusion identified, no pneumothorax apparent.



CARDIOVASCULAR:

Cardiomegaly.  No evidence of acute, significant cardiovascular 

disease. 



OSSEOUS STRUCTURES:

Stable finding following a right 5th rib resection.



VISUALIZED UPPER ABDOMEN:

Normal.



OTHER FINDINGS:

None.



IMPRESSION:

No active disease. No significant interval change compared to the 

prior examination(s).

## 2018-03-13 NOTE — C.PDOC
History Of Present Illness





<Alexis Massey - Last Filed: 18 16:10>





<BryonSerna M - Last Filed: 18 16:43>


Patient is a 67 year old  male with past medical history DM, HTN, HLD, 

PAD, CAD, CHF, COPD, asthma, who presents to the ED with complaints of 

generalized body aches for 2 days, intermittent chills and shortness of breath.

  Patient admits to chest tightness/SOB that is secondary to his asthma, left 

calf tenderness and right knee pain. (Paulette Masseyla BECKY)


History Per: Patient


History/Exam Limitations: no limitations


Onset/Duration Of Symptoms: Days


Current Symptoms Are (Timing): Gone (SOB gone after Duoneb treatment)


Severity: Mild


Pain Scale Rating Of: 1


Context: Generalized body aches


Location: Right knee pain and left calf tenderness 


Additional History Per: Patient





<Alexis Massey - Last Filed: 18 16:10>





<Kareem Slater M - Last Filed: 18 16:43>


Time Seen by Provider: 18 11:31


Chief Complaint (Nursing): Shortness Of Breath





Past Medical History





- Medical History


PMH: Arthritis (B/L), Asthma, COPD, Diabetes, Gastritis, HTN, 

Hypercholesterolemia


   Denies: Chronic Kidney Disease


Family History: States: Unknown Family Hx, CAD





- Social History


Hx Tobacco Use: No


Hx Alcohol Use: No


Hx Substance Use: No





- Immunization History


Hx Tetanus Toxoid Vaccination: No


Hx Influenza Vaccination: No


Hx Pneumococcal Vaccination: No





<Alexis Massey - Last Filed: 18 16:10>


Vital Signs: 





 Last Vital Signs











Temp  98.2 F   18 16:16


 


Pulse  94 H  18 16:16


 


Resp  20   18 16:16


 


BP  129/67   18 16:16


 


Pulse Ox  92 L  18 16:41














- [facility] Procedures











CONTRAST ARTERIOGRAM-LEG (09/24/15)


INSERT INT PENILE PROSTH (07/21/15)


PLAIN RADIOGRAPHY OF AORTA, BI LE ART USING L OSM CONTRAST (16)











Review Of Systems


Constitutional: Positive for: Chills.  Negative for: Fever, Weakness


Cardiovascular: Positive for: Chest Pain (associated with SOB, secondary to pt'

s hx of asthma ).  Negative for: Palpitations, Paroxysmal Noc. Dyspnea, Edema


Respiratory: Positive for: Shortness of Breath, SOB with Excertion.  Negative 

for: Cough


Gastrointestinal: Positive for: Other (Abdominal discomfort due to chronic 

constipation secondary to chronic pain medication use ).  Negative for: Nausea, 

Vomiting


Neurological: Negative for: Confusion





<BrysonJennybraeden E - Last Filed: 18 16:10>





Physical Exam





- Physical Exam


Appears: No Acute Distress


Skin: Normal Color


Head: Atraumatic, Normacephalic


Eye(s): bilateral: Normal Inspection, EOMI


Oral Mucosa: Moist


Lips: Normal Appearing


Cardiovascular: Rhythm Regular


Gastrointestinal/Abdominal: Bowel Sounds, Soft, Other (Mild discomfort 

secondary to chronic constipation )


Extremity: Calf Tenderness (Left calf tenderness and right knee pain )


Neurological/Psych: Oriented x3, Normal Speech





<BrysonJennybraeden E - Last Filed: 18 16:10>





ED Course And Treatment





- Laboratory Results


Result Diagrams: 


 18 12:14





 18 12:14


ECG Rhythm: Sinus Tachycardia, R BBB


Interpretation Of ECG: Possible left atrial enlargement, Right bundle branch 

block


Rate From EC


O2 Sat by Pulse Oximetry: 92





- Radiology


CXR: Read By Radiologist


CXR Interpretation: Yes: No Acute Disease


Progress Note: Mildy elevated D-dimer and sinus tachycardia: f/U Angio chest CT


Reevaluation Time: 16:00 (Improved SOB after duoneb treatment )


Reassessment Condition: Improved





<BrysonJennybraeden E - Last Filed: 18 16:10>





- Laboratory Results


Result Diagrams: 


 18 12:14





 18 12:14





<Kareem Slater M - Last Filed: 18 16:43>





Medical Decision Making





<Alexis Massey E - Last Filed: 18 16:10>





<Kareem Slater M - Last Filed: 18 16:43>


Medical Decision Making: 


Elevated D-dimer: 


* Chest CT (3/13/18): No evidence of pulmonary embolism.  Small hiatal hernia.  

Nonspecific heterogeneous ground-glass opacity in both lower lobes.


Left Calf Tenderness:


* Bilateral LE Venous doppler (3/13/18): Negative for DVT 


Right Knee pain:


* Right knee X-ray: Arthrosis, Atherosclerotic vascular calcifications and graft

, No fracture or lytic lesion


 (Alexis Massey)





Disposition


Discussed With : Kareem Slater





- Disposition


Disposition Time: 16:35





<Alexis Massey - Last Filed: 18 16:10>





<Kareem Slater - Last Filed: 18 16:43>





- Disposition


Referrals: 


Divine Handley MD [Staff Provider] - 


Disposition: HOME/ ROUTINE


Condition: GOOD


Additional Instructions: 


Please follow up with your primary care physician, Dr. Handley in 2 days 


Please continue your home medication as prescribed by your primary care 

physician, Dr. Handley


Please address blood sugar episodes with your primary care physician, Dr. Handley in order to have medicine reconciliation


Please take tramadol 50mg PO TID for severe Pain as needed 


Please return to the ED if symptoms worsens or progress 


Please take care  





Prescriptions: 


traMADol [Ultram] 50 mg PO TID PRN #15 tab


 PRN Reason: Pain, Severe (8-10)


Instructions:  Asthma in Adults, Joint Pain


Forms:  [facility] (English), General Discharge Instructions





- Clinical Impression


Clinical Impression: 


 Arthralgia, Asthma

## 2018-03-14 NOTE — CARD
--------------- APPROVED REPORT --------------





EKG Measurement

Heart [id number]

NC [id number]

[id number]

[id number]

[id number]



<Conclusion>

Sinus tachycardia

Possible Left atrial enlargement

Right bundle branch block

Abnormal ECG

## 2018-10-12 ENCOUNTER — HOSPITAL ENCOUNTER (EMERGENCY)
Dept: HOSPITAL 31 - C.ER | Age: 68
Discharge: HOME | End: 2018-10-12
Payer: MEDICARE

## 2018-10-12 VITALS — DIASTOLIC BLOOD PRESSURE: 78 MMHG | HEART RATE: 62 BPM | OXYGEN SATURATION: 97 % | SYSTOLIC BLOOD PRESSURE: 153 MMHG

## 2018-10-12 VITALS — BODY MASS INDEX: 33.9 KG/M2

## 2018-10-12 VITALS — TEMPERATURE: 97.8 F | RESPIRATION RATE: 18 BRPM

## 2018-10-12 DIAGNOSIS — E11.9: ICD-10-CM

## 2018-10-12 DIAGNOSIS — I10: ICD-10-CM

## 2018-10-12 DIAGNOSIS — E78.00: ICD-10-CM

## 2018-10-12 DIAGNOSIS — M62.838: Primary | ICD-10-CM

## 2018-10-12 DIAGNOSIS — J44.9: ICD-10-CM

## 2018-10-12 PROCEDURE — 99284 EMERGENCY DEPT VISIT MOD MDM: CPT

## 2018-10-12 PROCEDURE — 93971 EXTREMITY STUDY: CPT

## 2018-10-12 PROCEDURE — 96372 THER/PROPH/DIAG INJ SC/IM: CPT

## 2018-10-12 NOTE — C.PDOC
History Of Present Illness





68-year-old male, presents to the emergency department with complaints of two-

month Hx of left calf pain, which worsens when he walks. Patient denies any 

shortness of breath, chest pain, recent travel or any other associated symptoms.

no other complaints at this time.


Time Seen by Provider: 10/12/18 15:06


Chief Complaint (Nursing): Lower Extremity Problem/Injury


History Per: Patient


History/Exam Limitations: no limitations


Onset/Duration Of Symptoms: Persistent


Current Symptoms Are (Timing): Still Present


Severity: Moderate


Recent travel outside of the [place]: No





Past Medical History


Reviewed: Historical Data, Nursing Documentation, Vital Signs


Vital Signs: 





                                Last Vital Signs











Temp  97.8 F   10/12/18 14:59


 


Pulse  62   10/12/18 16:29


 


Resp  18   10/12/18 16:29


 


BP  153/78 H  10/12/18 16:29


 


Pulse Ox  97   10/12/18 16:29














- Medical History


PMH: Arthritis (B/L), Asthma, COPD, Diabetes, Gastritis, HTN, 

Hypercholesterolemia





- [facility] Procedures











CONTRAST ARTERIOGRAM-LEG (09/24/15)


INSERT INT PENILE PROSTH (07/21/15)


PLAIN RADIOGRAPHY OF AORTA, BI LE ART USING L OSM CONTRAST (08/19/16)








Family History: States: CAD





- Social History


Hx Tobacco Use: No


Hx Alcohol Use: No


Hx Substance Use: No





- Immunization History


Hx Tetanus Toxoid Vaccination: No


Hx Influenza Vaccination: No


Hx Pneumococcal Vaccination: No





Review Of Systems


Constitutional: Negative for: Fever, Chills


Eyes: Negative for: Pain, Vision Change


ENT: Negative for: Ear Pain


Cardiovascular: Positive for: Edema.  Negative for: Chest Pain, Palpitations, 

Light Headedness


Respiratory: Negative for: Shortness of Breath


Gastrointestinal: Negative for: Nausea, Vomiting


Genitourinary: Negative for: Dysuria, Hematuria


Musculoskeletal: Positive for: Leg Pain.  Negative for: Neck Pain


Skin: Negative for: Rash, Lesions


Neurological: Negative for: Weakness, Numbness





Physical Exam





- Physical Exam


Appears: Non-toxic, No Acute Distress


Skin: Warm, Dry, No Rash


Head: Atraumatic, Normacephalic


Eye(s): bilateral: Normal Inspection


Nose: Normal


Oral Mucosa: Moist


Lips: Normal Appearing


Neck: Normal ROM, Supple


Chest: Symmetrical, No Tenderness


Cardiovascular: Rhythm Regular, No Friction Rub, No Murmur


Respiratory: Normal Breath Sounds, No Accessory Muscle Use, No Stridor, No 

Wheezing


Gastrointestinal/Abdominal: Soft, No Tenderness


Back: Normal Inspection, No CVA Tenderness


Extremity: Normal ROM, Pedal Edema (+1 pitting), Calf Tenderness (minimal, left 

), No Deformity


Pulses: Left Dorsalis Pedis: Normal, Right Dorsalis Pedis: Normal


Neurological/Psych: Oriented x3, Normal Speech, Normal Motor


Gait: Steady





ED Course And Treatment


O2 Sat by Pulse Oximetry: 97 (on RA)


Pulse Ox Interpretation: Normal (RA)





Medical Decision Making


Medical Decision Making: 


Venous Doppler is negative for DVT. On re-exam, the patient is ambulatory in the

ED with steady gait. 





Disposition





- Disposition


Referrals: 


Divine Handley MD [Staff Provider] - 


Disposition: HOME/ ROUTINE


Disposition Time: 16:30


Condition: STABLE


Additional Instructions: 


Follow up with the medical doctor within 1-2 days. return if worsened.


Prescriptions: 


Naproxen 375 mg PO BID #20 tablet


Instructions:  Muscle Spasms (DC)


Forms:  [facility] (English)


Print Language: Bel[place]ian





- Clinical Impression


Clinical Impression: 


 Muscle spasm








- Scribe Statement


The provider has reviewed the documentation as recorded by the Scribe (Sharee Hong)








All medical record entries made by the Scribe were at my direction and 

personally dictated by me. I have reviewed the chart and agree that the record 

accurately reflects my personal performance of the history, physical exam, 

medical decision making, and the department course for this patient. I have also

personally directed, reviewed, and agree with the discharge instructions and d

isposition.

## 2018-10-15 NOTE — VASCLAB
Date of service: 



10/12/2018



PROCEDURE:  Left Lower Extremity Venous Duplex Exam. 



HISTORY:

L calf pain, swelling 



PRIORS:

None. 



TECHNIQUE:

Left common femoral, femoral, popliteal and posterior tibial, 

peroneal and great saphenous veins were evaluated. Flow was assessed 

with color Doppler, compressibility, assessment of phasic flow and 

augmentation response.



Report prepared by   REGAN Loya, RVT



FINDINGS:



LEFT:

1. Common Femoral Vein:



1.1.  Compressibility - Fully compressible: Thrombus - None : Flow - 

Phasic: Augmentation -Normal: Reflux - None.



2. Femoral Vein: 



2.1. Compressibility - Fully compressible: Thrombus -  None: Flow - 

Phasic: Augmentation -Normal: Reflux - None.



3. Popliteal Vein: 



3.1. Compressibility - Fully compressible: Thrombus -  None: Flow - 

Phasic: Augmentation -Normal: Reflux - None.



4. Posterior Tibial Vein: 



4.1. Compressibility - Fully compressible: Thrombus -  None: Flow - 

Phasic: Augmentation -Normal: Reflux - None.



5. Peroneal Vein: 



5.1. Compressibility - Fully compressible: Thrombus -  None: Flow - 

Phasic: Augmentation -Normal: Reflux - None.



6. Great Saphenous Vein:

6.1.  Compressibility - Fully compressible: Thrombus - None: Flow - 

Phasic: Augmentation - Normal: Reflux - None.





OTHER FINDINGS:  



IMPRESSION:

No evidence of deep or superficial vein thrombosis of the left lower 

extremity with excellent venous flow. Normal valve function noted of 

the left side.     



Normal venous flow noted in the right common femoral vein.

## 2018-11-27 ENCOUNTER — HOSPITAL ENCOUNTER (EMERGENCY)
Dept: HOSPITAL 31 - C.ER | Age: 68
Discharge: HOME | End: 2018-11-27
Payer: MEDICARE

## 2018-11-27 VITALS
TEMPERATURE: 98.7 F | SYSTOLIC BLOOD PRESSURE: 177 MMHG | OXYGEN SATURATION: 96 % | RESPIRATION RATE: 18 BRPM | DIASTOLIC BLOOD PRESSURE: 74 MMHG | HEART RATE: 95 BPM

## 2018-11-27 VITALS — BODY MASS INDEX: 33.9 KG/M2

## 2018-11-27 DIAGNOSIS — S86.912A: Primary | ICD-10-CM
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
## 2018-11-27 NOTE — C.PDOC
History Of Present Illness





68 year old male presents to the ED for evaluation of left calf pain that began 

in Feb 2018. Patient was sent for an MRI by PMD Dr. Angel, which showed mild 

inflammation of the left calf muscle area. Patient admits to occasionally taking

Motrin and icing the anterior tibia with no improvement. Denies chest pain, 

numbness, tingling, trauma, falls, fever, and any other associated symptoms. 





Time Seen by Provider: 11/27/18 11:15


Chief Complaint (Nursing): Back Pain


History Per: Patient


History/Exam Limitations: no limitations


Onset/Duration Of Symptoms: Other (months.)


Current Symptoms Are (Timing): Still Present





Past Medical History


Reviewed: Historical Data, Nursing Documentation, Vital Signs


Vital Signs: 





                                Last Vital Signs











Temp  98.7 F   11/27/18 09:47


 


Pulse  95 H  11/27/18 09:47


 


Resp  18   11/27/18 09:47


 


BP  177/74 H  11/27/18 09:47


 


Pulse Ox  96   11/27/18 09:47














- Medical History


PMH: Arthritis (B/L), Asthma, COPD, Diabetes, Gastritis, HTN, 

Hypercholesterolemia


   Denies: Chronic Kidney Disease





- [facility] Procedures











CONTRAST ARTERIOGRAM-LEG (09/24/15)


INSERT INT PENILE PROSTH (07/21/15)


PLAIN RADIOGRAPHY OF AORTA, BI LE ART USING L OSM CONTRAST (08/19/16)








Family History: States: Unknown Family Hx, CAD





- Social History


Hx Tobacco Use: No


Hx Alcohol Use: No


Hx Substance Use: No





- Immunization History


Hx Tetanus Toxoid Vaccination: No


Hx Influenza Vaccination: No


Hx Pneumococcal Vaccination: No





Review Of Systems


Except As Marked, All Systems Reviewed And Found Negative.


Constitutional: Negative for: Fever, Other (trauma. falls.)


Cardiovascular: Negative for: Chest Pain


Musculoskeletal: Positive for: Other (of left calf pain.)


Neurological: Negative for: Weakness, Numbness, Incoordination





Physical Exam





- Physical Exam


Appears: Well, Non-toxic, No Acute Distress


Skin: Normal Color, Warm, Dry


Head: Atraumatic, Normacephalic


Eye(s): bilateral: PERRL


Neck: Normal ROM, Supple


Respiratory: Other (no acute respiratory distress.)


Extremity: Normal ROM (of the left lower extremity.), No Tenderness, Capillary 

Refill (less than 2 seconds.), No Deformity, No Swelling, No Other (normal left 

calf, is equal to the right calf.)


Pulses: Left Dorsalis Pedis: Normal, Right Dorsalis Pedis: Normal


Neurological/Psych: Oriented x3, Normal Speech, Normal Cognition, Normal Motor, 

Normal Sensation, Normal Reflexes





ED Course And Treatment


O2 Sat by Pulse Oximetry: 96 (RA)


Pulse Ox Interpretation: Normal





Medical Decision Making


Medical Decision Making: 





minor calf muscle/gastrocnemius strain dx by MRI as opt 10/24/28


pt has been placing ice on ANTERIOR surface of tib/fib without improvement


examined and no sig tenderness/swelling/edema





supine in ED with ice pack educated x 15 mins





NO heel boot for this elderly pt for falls risk





Plan: 


-Motrin. 


Ice.





Progress/Update: 





Patient was laid on his back to ice the back of the calf muscle.





Patients pain improved with ice. 





Patient stable for discharge home.





Prescribed Advil and Naproxen. 














Disposition


Doctor Will See Patient In The: Office


Counseled Patient/Family Regarding: Studies Performed, Diagnosis





- Disposition


Referrals: 


Emilia Angel [Non-Staff] - 


Disposition: HOME/ ROUTINE


Disposition Time: 11:29


Condition: GOOD


Additional Instructions: 


bolsa de hielo 1/2 hora por hora DETRAS del musculo del pierna 


nada caliente


ibuprofeno/advil 400 mg cada 6 horas huma necessario





Sigue con Dr. Angel huma necessario.


Prescriptions: 


Ibuprofen [Advil] 400 mg PO Q6H PRN #200 tablet


 PRN Reason: pain 


Instructions:  Lower Extremity Muscle Strain


Forms:  [employer] (English)


Print Language: Romansh





- Clinical Impression


Clinical Impression: 


 Strain of calf muscle








- Scribe Statement


The provider has reviewed the documentation as recorded by the Scribe (Majo Ross)


Provider Attestation: 





All medical record entries made by the Scribe were at my direction and 

personally dictated by me. I have reviewed the chart and agree that the record 

accurately reflects my personal performance of the history, physical exam, 

medical decision making, and the department course for this patient. I have also

 personally directed, reviewed, and agree with the discharge instructions and 

disposition.

## 2019-01-28 ENCOUNTER — HOSPITAL ENCOUNTER (EMERGENCY)
Dept: HOSPITAL 31 - C.ER | Age: 69
Discharge: HOME | End: 2019-01-28
Payer: MEDICARE

## 2019-01-28 VITALS
RESPIRATION RATE: 20 BRPM | HEART RATE: 84 BPM | OXYGEN SATURATION: 96 % | SYSTOLIC BLOOD PRESSURE: 140 MMHG | DIASTOLIC BLOOD PRESSURE: 75 MMHG | TEMPERATURE: 97.3 F

## 2019-01-28 VITALS — BODY MASS INDEX: 33.9 KG/M2

## 2019-01-28 DIAGNOSIS — G89.29: Primary | ICD-10-CM

## 2019-01-28 DIAGNOSIS — J11.1: ICD-10-CM

## 2019-01-28 NOTE — C.PDOC
History Of Present Illness


68 year old male presents to the ED for evaluation of chronic whole body pain. 

Patient reports he occasionally takes Tylenol with relief. Patient has had 

multiple prior evaluations for chronic pain issues. He denies fever, chills. 


Time Seen by Provider: 01/28/19 11:57


Chief Complaint (Nursing): Flu-like Symptoms


History Per: Patient


History/Exam Limitations: no limitations


Onset/Duration Of Symptoms: Days


Current Symptoms Are (Timing): Still Present


Location Of Pain: Diffuse Myalgias


Associated Symptoms: denies: Fever, Chills


Additional History Per: Patient





Past Medical History


Reviewed: Historical Data, Nursing Documentation, Vital Signs


Vital Signs: 





                                Last Vital Signs











Temp  97.3 F L  01/28/19 11:29


 


Pulse  84   01/28/19 11:29


 


Resp  20   01/28/19 11:29


 


BP  140/75   01/28/19 11:29


 


Pulse Ox  96   01/28/19 11:29














- Medical History


PMH: Arthritis (B/L), Asthma, COPD, Diabetes, Gastritis, HTN, 

Hypercholesterolemia


   Denies: Chronic Kidney Disease


Surgical History: No Surg Hx





- [facility] Procedures











CONTRAST ARTERIOGRAM-LEG (09/24/15)


INSERT INT PENILE PROSTH (07/21/15)


PLAIN RADIOGRAPHY OF AORTA, BI LE ART USING L OSM CONTRAST (08/19/16)








Family History: States: CAD





- Social History


Hx Tobacco Use: No


Hx Alcohol Use: No


Hx Substance Use: No





- Immunization History


Hx Tetanus Toxoid Vaccination: No


Hx Influenza Vaccination: Yes


Hx Pneumococcal Vaccination: Yes





Review Of Systems


Constitutional: Negative for: Fever, Chills


Musculoskeletal: Positive for: Other (generalized body pain, chronic )





Physical Exam





- Physical Exam


Appears: Non-toxic, No Acute Distress, Other (obese  male )


Skin: Normal Color, Warm, Dry, No Rash (chest)


Head: Atraumatic, Normacephalic


Eye(s): bilateral: Normal Inspection


Oral Mucosa: Moist


Neck: Supple


Chest: Symmetrical, No Deformity, No Tenderness


Cardiovascular: Rhythm Regular, No Murmur


Respiratory: Normal Breath Sounds, No Rales, No Rhonchi, No Wheezing


Gastrointestinal/Abdominal: Soft, No Tenderness, No Guarding, No Rebound, Other 

(obese, round )


Extremity: Normal ROM, Capillary Refill (less than 2 seconds ), No Other 

(clubbing, cyanosis or edema to lower extremities )


Neurological/Psych: Normal Speech, Normal Cognition, Other (argumentative, 

confrontational )





ED Course And Treatment


O2 Sat by Pulse Oximetry: 96


Progress Note: Motrin PO given.





Medical Decision Making


Medical Decision Making: 





body aches x 2 weeks


poor relief with Tylenol


Advil advised and given


no rash noted below eyes/chest


ok for f/u w Dr. Soto








Disposition


Doctor Will See Patient In The: Office


Counseled Patient/Family Regarding: Studies Performed, Diagnosis





- Disposition


Referrals: 


Divine Handley MD [Staff Provider] - 


Disposition: HOME/ ROUTINE


Disposition Time: 12:03


Condition: GOOD


Additional Instructions: 


ibuprofeno/Advil/Motrin 400-600 mg cada 6 horas huma necessario





Sigue con Dra. Soto





Instructions:  Chronic Pain (DC), Viral Syndrome (DC)


Forms:  [employer] (English)


Print Language: Guinean





- Clinical Impression


Clinical Impression: 


 Influenza-like illness, Chronic pain








- Scribe Statement


The provider has reviewed the documentation as recorded by the Scribe (Yaritza Correa)


Provider Attestation: 








All medical record entries made by the Scribe were at my direction and 

personally dictated by me. I have reviewed the chart and agree that the record 

accurately reflects my personal performance of the history, physical exam, medic

al decision making, and the department course for this patient. I have also 

personally directed, reviewed, and agree with the discharge instructions and 

disposition.

## 2019-04-05 NOTE — C.PDOC
History Of Present Illness


67 y/o male pt with hx of Arthritis (B/L), Asthma, COPD, Diabetes, Gastritis, 

HTN and  Hypercholesterolemia presents to the ER c/o itchy rash to the abdomen, 

back and b/l forearms for x4 days. Pt denies starting any new lotion, perfume, 

diet or new contact of any sort. Pt also denies pain, recent illness, fever, 

chills, nausea, vomiting, SOB, chest pain, sore throat or any urinary sx. 





Time Seen by Provider: 04/05/19 10:40


Chief Complaint (Nursing): Abnormal Skin Integrity


History Per: Patient


History/Exam Limitations: no limitations


Onset/Duration Of Symptoms: Days (x4)


Current Symptoms Are (Timing): Still Present


Location Of Injury: Right: Forearm, Left: Forearm, Anterior: Abdomen, Posterior:

Back


Quality Of Symptoms: Itching.  denies: Painful





Past Medical History


Reviewed: Historical Data, Nursing Documentation, Vital Signs


Vital Signs: 





                                Last Vital Signs











Temp  97.8 F   04/05/19 10:25


 


Pulse  93 H  04/05/19 10:25


 


Resp  17   04/05/19 10:25


 


BP  144/83   04/05/19 10:25


 


Pulse Ox  95   04/05/19 10:25














- Medical History


PMH: Arthritis (B/L), Asthma, COPD, Diabetes, Gastritis, HTN, 

Hypercholesterolemia





- [facility] Procedures











CONTRAST ARTERIOGRAM-LEG (09/24/15)


INSERT INT PENILE PROSTH (07/21/15)


PLAIN RADIOGRAPHY OF AORTA, BI LE ART USING L OSM CONTRAST (08/19/16)








Family History: States: Unknown Family Hx, CAD





- Social History


Hx Tobacco Use: No


Hx Alcohol Use: No


Hx Substance Use: No





- Immunization History


Hx Tetanus Toxoid Vaccination: No (UNSURE)


Hx Influenza Vaccination: No (UNSURE)


Hx Pneumococcal Vaccination: No (UNSURE)





Review Of Systems


Constitutional: Positive for: Other (no new lotion, diet, or any contact of the 

sort. ).  Negative for: Fever, Chills


ENT: Negative for: Throat Pain


Cardiovascular: Negative for: Chest Pain


Respiratory: Negative for: Shortness of Breath


Gastrointestinal: Negative for: Nausea, Vomiting


Genitourinary: Negative for: Dysuria, Frequency, Incontinence, Hematuria


Skin: Positive for: Rash (back, abdomen, b/l forearms ).  Negative for: Lesions,

Other (pain on rash areas )





Physical Exam





- Physical Exam


Appears: Non-toxic, No Acute Distress


Skin: Warm, Dry, Rash (macular, papular rash noted on abdomen, back and forearm;

erythematous and palpatations on palpations)


Head: Atraumatic, Normacephalic


Eye(s): bilateral: Normal Inspection, PERRL, EOMI


Oral Mucosa: Moist


Tongue: Normal Appearing, No Swelling


Lips: Normal Appearing, No Swelling


Throat: Normal, No Erythema, No Exudate


Neck: Normal ROM, Supple


Cardiovascular: Rhythm Regular


Respiratory: Normal Breath Sounds, No Accessory Muscle Use


Gastrointestinal/Abdominal: Soft, No Tenderness


Extremity: Normal ROM (x4)


Neurological/Psych: Oriented x3, Normal Speech, Normal Cognition, Normal Motor, 

Normal Sensation





ED Course And Treatment


O2 Sat by Pulse Oximetry: 95 (RA)


Pulse Ox Interpretation: Normal





Medical Decision Making


Medical Decision Making: 


plans: 


-- benadryl 


-- decadron 


-- pepcid





Patient verbalizes understanding and is in agreement with plan. Patient is 

stable for discharge.





Disposition


Counseled Patient/Family Regarding: Diagnosis, Need For Followup, Rx Given





- Disposition


Referrals: 


[facility] [Outside]


Moshe Melendrez MD [Staff Provider] - 


Jaspreet Willett MD [Staff Provider] - 


Luca Diaz MD [Staff Provider] - 


Disposition: HOME/ ROUTINE


Disposition Time: 11:06


Condition: STABLE


Additional Instructions: 


Apply topical cream to arms, abdomen , and back twice a day


continue Benadryl and Pepcid as needed


followup with Derm in 1-2 days


Return to ED if symptoms worsen


Prescriptions: 


Clotrimazole/Betamethasone [Lotrisone] 1 appl TOP BID #1 tube


DiphenhydrAMINE [Benadryl] 25 mg PO BID #14 cap


Famotidine [Pepcid] 20 mg PO DAILY #14 tab


Instructions:  Contact Dermatitis (DC), Skin Rash (DC)


Forms:  [employer] (Turkish)


Print Language: Dominican





- Clinical Impression


Clinical Impression: 


 Allergic contact dermatitis, Skin rash








- PA / NP / Resident Statement


MD/ has reviewed & agrees with the documentation as recorded.





- Scribe Statement


The provider has reviewed the documentation as recorded by the Bhargav Meng Do





All medical record entries made by the Scribe were at my direction and 

personally dictated by me. I have reviewed the chart and agree that the record 

accurately reflects my personal performance of the history, physical exam, 

medical decision making, and the department course for this patient. I have also

 personally directed, reviewed, and agree with the discharge instructions and 

disposition.